# Patient Record
Sex: MALE | Race: WHITE | NOT HISPANIC OR LATINO | URBAN - METROPOLITAN AREA
[De-identification: names, ages, dates, MRNs, and addresses within clinical notes are randomized per-mention and may not be internally consistent; named-entity substitution may affect disease eponyms.]

---

## 2019-03-23 ENCOUNTER — INPATIENT (INPATIENT)
Facility: HOSPITAL | Age: 79
LOS: 5 days | Discharge: HOME HEALTH | End: 2019-03-29

## 2019-03-23 VITALS
DIASTOLIC BLOOD PRESSURE: 78 MMHG | HEART RATE: 101 BPM | TEMPERATURE: 97 F | SYSTOLIC BLOOD PRESSURE: 132 MMHG | RESPIRATION RATE: 18 BRPM | OXYGEN SATURATION: 97 %

## 2019-03-23 PROBLEM — Z00.00 ENCOUNTER FOR PREVENTIVE HEALTH EXAMINATION: Status: ACTIVE | Noted: 2019-03-23

## 2019-03-23 LAB
ALBUMIN SERPL ELPH-MCNC: 4.4 G/DL — SIGNIFICANT CHANGE UP (ref 3.5–5.2)
ALP SERPL-CCNC: 65 U/L — SIGNIFICANT CHANGE UP (ref 30–115)
ALT FLD-CCNC: 44 U/L — HIGH (ref 0–41)
ANION GAP SERPL CALC-SCNC: 13 MMOL/L — SIGNIFICANT CHANGE UP (ref 7–14)
APPEARANCE UR: CLEAR — SIGNIFICANT CHANGE UP
AST SERPL-CCNC: 203 U/L — HIGH (ref 0–41)
BACTERIA # UR AUTO: ABNORMAL /HPF
BASOPHILS # BLD AUTO: 0.03 K/UL — SIGNIFICANT CHANGE UP (ref 0–0.2)
BASOPHILS NFR BLD AUTO: 0.4 % — SIGNIFICANT CHANGE UP (ref 0–1)
BILIRUB SERPL-MCNC: 0.6 MG/DL — SIGNIFICANT CHANGE UP (ref 0.2–1.2)
BILIRUB UR-MCNC: NEGATIVE — SIGNIFICANT CHANGE UP
BUN SERPL-MCNC: 18 MG/DL — SIGNIFICANT CHANGE UP (ref 10–20)
CALCIUM SERPL-MCNC: 9.4 MG/DL — SIGNIFICANT CHANGE UP (ref 8.5–10.1)
CHLORIDE SERPL-SCNC: 97 MMOL/L — LOW (ref 98–110)
CO2 SERPL-SCNC: 25 MMOL/L — SIGNIFICANT CHANGE UP (ref 17–32)
COLOR SPEC: YELLOW — SIGNIFICANT CHANGE UP
CREAT SERPL-MCNC: 1.2 MG/DL — SIGNIFICANT CHANGE UP (ref 0.7–1.5)
DIFF PNL FLD: ABNORMAL
EOSINOPHIL # BLD AUTO: 0 K/UL — SIGNIFICANT CHANGE UP (ref 0–0.7)
EOSINOPHIL NFR BLD AUTO: 0 % — SIGNIFICANT CHANGE UP (ref 0–8)
GLUCOSE SERPL-MCNC: 92 MG/DL — SIGNIFICANT CHANGE UP (ref 70–99)
GLUCOSE UR QL: NEGATIVE MG/DL — SIGNIFICANT CHANGE UP
GRAN CASTS # UR COMP ASSIST: ABNORMAL /LPF
HCT VFR BLD CALC: 35.3 % — LOW (ref 42–52)
HGB BLD-MCNC: 12.3 G/DL — LOW (ref 14–18)
IMM GRANULOCYTES NFR BLD AUTO: 0.1 % — SIGNIFICANT CHANGE UP (ref 0.1–0.3)
KETONES UR-MCNC: ABNORMAL
LEUKOCYTE ESTERASE UR-ACNC: NEGATIVE — SIGNIFICANT CHANGE UP
LYMPHOCYTES # BLD AUTO: 1.04 K/UL — LOW (ref 1.2–3.4)
LYMPHOCYTES # BLD AUTO: 15.5 % — LOW (ref 20.5–51.1)
MCHC RBC-ENTMCNC: 29.3 PG — SIGNIFICANT CHANGE UP (ref 27–31)
MCHC RBC-ENTMCNC: 34.8 G/DL — SIGNIFICANT CHANGE UP (ref 32–37)
MCV RBC AUTO: 84 FL — SIGNIFICANT CHANGE UP (ref 80–94)
MONOCYTES # BLD AUTO: 0.95 K/UL — HIGH (ref 0.1–0.6)
MONOCYTES NFR BLD AUTO: 14.2 % — HIGH (ref 1.7–9.3)
NEUTROPHILS # BLD AUTO: 4.66 K/UL — SIGNIFICANT CHANGE UP (ref 1.4–6.5)
NEUTROPHILS NFR BLD AUTO: 69.8 % — SIGNIFICANT CHANGE UP (ref 42.2–75.2)
NITRITE UR-MCNC: NEGATIVE — SIGNIFICANT CHANGE UP
NRBC # BLD: 0 /100 WBCS — SIGNIFICANT CHANGE UP (ref 0–0)
PH UR: 6 — SIGNIFICANT CHANGE UP (ref 5–8)
PLATELET # BLD AUTO: 208 K/UL — SIGNIFICANT CHANGE UP (ref 130–400)
POTASSIUM SERPL-MCNC: 4 MMOL/L — SIGNIFICANT CHANGE UP (ref 3.5–5)
POTASSIUM SERPL-SCNC: 4 MMOL/L — SIGNIFICANT CHANGE UP (ref 3.5–5)
PROT SERPL-MCNC: 7 G/DL — SIGNIFICANT CHANGE UP (ref 6–8)
PROT UR-MCNC: 100 MG/DL
RBC # BLD: 4.2 M/UL — LOW (ref 4.7–6.1)
RBC # FLD: 12 % — SIGNIFICANT CHANGE UP (ref 11.5–14.5)
RBC CASTS # UR COMP ASSIST: SIGNIFICANT CHANGE UP /HPF
SODIUM SERPL-SCNC: 135 MMOL/L — SIGNIFICANT CHANGE UP (ref 135–146)
SP GR SPEC: 1.02 — SIGNIFICANT CHANGE UP (ref 1.01–1.03)
TROPONIN T SERPL-MCNC: 0.03 NG/ML — CRITICAL HIGH
UROBILINOGEN FLD QL: 1 MG/DL (ref 0.2–0.2)
WBC # BLD: 6.69 K/UL — SIGNIFICANT CHANGE UP (ref 4.8–10.8)
WBC # FLD AUTO: 6.69 K/UL — SIGNIFICANT CHANGE UP (ref 4.8–10.8)
WBC UR QL: SIGNIFICANT CHANGE UP /HPF

## 2019-03-23 RX ORDER — HYDROCHLOROTHIAZIDE 25 MG
25 TABLET ORAL DAILY
Qty: 0 | Refills: 0 | Status: DISCONTINUED | OUTPATIENT
Start: 2019-03-23 | End: 2019-03-26

## 2019-03-23 RX ORDER — GABAPENTIN 400 MG/1
100 CAPSULE ORAL
Qty: 0 | Refills: 0 | Status: DISCONTINUED | OUTPATIENT
Start: 2019-03-23 | End: 2019-03-29

## 2019-03-23 RX ORDER — ATORVASTATIN CALCIUM 80 MG/1
20 TABLET, FILM COATED ORAL AT BEDTIME
Qty: 0 | Refills: 0 | Status: DISCONTINUED | OUTPATIENT
Start: 2019-03-23 | End: 2019-03-24

## 2019-03-23 RX ORDER — ATORVASTATIN CALCIUM 80 MG/1
1 TABLET, FILM COATED ORAL
Qty: 0 | Refills: 0 | COMMUNITY

## 2019-03-23 RX ORDER — LOSARTAN POTASSIUM 100 MG/1
100 TABLET, FILM COATED ORAL DAILY
Qty: 0 | Refills: 0 | Status: DISCONTINUED | OUTPATIENT
Start: 2019-03-23 | End: 2019-03-27

## 2019-03-23 RX ORDER — ENOXAPARIN SODIUM 100 MG/ML
40 INJECTION SUBCUTANEOUS EVERY 24 HOURS
Qty: 0 | Refills: 0 | Status: DISCONTINUED | OUTPATIENT
Start: 2019-03-23 | End: 2019-03-25

## 2019-03-23 RX ORDER — ASPIRIN/CALCIUM CARB/MAGNESIUM 324 MG
81 TABLET ORAL DAILY
Qty: 0 | Refills: 0 | Status: DISCONTINUED | OUTPATIENT
Start: 2019-03-23 | End: 2019-03-29

## 2019-03-23 NOTE — ED PROVIDER NOTE - CARE PLAN
Principal Discharge DX:	Chest pain  Secondary Diagnosis:	Generalized weakness  Secondary Diagnosis:	Transaminitis

## 2019-03-23 NOTE — ED PROVIDER NOTE - PHYSICAL EXAMINATION
VITAL SIGNS: I have reviewed nursing notes and confirm.  CONSTITUTIONAL: elderly m nad  SKIN: Skin exam is warm and dry, no acute rash.  HEAD: Normocephalic; atraumatic.  EYES: PERRL, EOM intact; conjunctiva and sclera clear.  ENT: No nasal discharge; airway clear.  NECK: Supple; non tender.  CARD: S1, S2 normal; no murmurs, gallops, or rubs. Regular rate and rhythm.  RESP: No wheezes, rales or rhonchi.  ABD: Normal bowel sounds; soft; non-distended; non-tender; no hepatosplenomegaly.  BACK: non-tender, no CVAT  EXT: Normal ROM. No clubbing, cyanosis or edema. DPI.  NEURO: aaox3, cn 2-12 intact bl no nystagmus or facial droop, 5/5 strength x 4 no drift, gross sensation intact, finger-nose nl  PSYCH: Cooperative, appropriate.

## 2019-03-23 NOTE — ED PROVIDER NOTE - OBJECTIVE STATEMENT
78y m deaf/mute h/o dm, htn, hl, leaky heart valve, recently elevated psa level pending outpt Uro consultation p/w weakness. HPI obtained from daughter @ bedside, states pt communicates by reading lips and mimicking actions, understands mostly [place] and some English, does not know sign language. Pt lives with his wife and son, daughter states she went over their house to visit today and went in to pt's bedroom. Found him in bed awake but he had not gotten up yet. Left room to allow him to get dressed and come out, some time passed and when she went back in to k on him found him sitting on floor to weak to get up. Tried to pick him up with her brother but he could not walk on his own, called 911. After talking with pt daughter rpts he communicated to them that he has been having intermitt CP x 1 week. Difficult to characterize/described due to baseline communication issues. Also rpts feeling generalized weakness over last week. Denies f/c, sob, nvd, abd pain, rash, edema. No sick contacts, recent travel or abx.

## 2019-03-23 NOTE — ED ADULT TRIAGE NOTE - CHIEF COMPLAINT QUOTE
pt with c/o chest pain for a week., weakness, fell today denies hitting his head , not on bloodthinners

## 2019-03-23 NOTE — ED ADULT NURSE NOTE - NSIMPLEMENTINTERV_GEN_ALL_ED
Implemented All Fall Risk Interventions:  [place] to call system. Call bell, personal items and telephone within reach. Instruct patient to call for assistance. Room bathroom lighting operational. Non-slip footwear when patient is off stretcher. Physically safe environment: no spills, clutter or unnecessary equipment. Stretcher in lowest position, wheels locked, appropriate side rails in place. Provide visual cue, wrist band, yellow gown, etc. Monitor gait and stability. Monitor for mental status changes and reorient to person, place, and time. Review medications for side effects contributing to fall risk. Reinforce activity limits and safety measures with patient and family.

## 2019-03-23 NOTE — ED PROVIDER NOTE - NS ED ROS FT
Constitutional: No fever, chills, unintended weight loss.  Eyes:  No visual changes, eye pain or discharge.  ENMT:  No hearing changes, pain, no sore throat or runny nose, no difficulty swallowing  Cardiac:  see hpi  Respiratory:  No cough or respiratory distress. No hemoptysis. No history of asthma or RAD.  GI:  No nausea, vomiting, diarrhea or abdominal pain.  :  No dysuria, frequency or burning.  MS:  No myalgia, muscle weakness, joint pain or back pain.  Neuro:  No headache or weakness.  No LOC.  Skin:  No skin rash.   Endocrine: No history of thyroid disease or diabetes.

## 2019-03-23 NOTE — H&P ADULT - NSHPLABSRESULTS_GEN_ALL_CORE
12.3   6.69  )-----------( 208      ( 23 Mar 2019 16:50 )             35.3           135  |  97<L>  |  18  ----------------------------<  92  4.0   |  25  |  1.2    Ca    9.4      23 Mar 2019 16:50    TPro  7.0  /  Alb  4.4  /  TBili  0.6  /  DBili  x   /  AST  203<H>  /  ALT  44<H>  /  AlkPhos  65                Urinalysis Basic - ( 23 Mar 2019 17:25 )    Color: Yellow / Appearance: Clear / S.020 / pH: x  Gluc: x / Ketone: Trace  / Bili: Negative / Urobili: 1.0 mg/dL   Blood: x / Protein: 100 mg/dL / Nitrite: Negative   Leuk Esterase: Negative / RBC: 1-2 /HPF / WBC 1-2 /HPF   Sq Epi: x / Non Sq Epi: x / Bacteria: Few /HPF            Lactate Trend      CARDIAC MARKERS ( 23 Mar 2019 16:50 )  x     / 0.03 ng/mL / x     / x     / x            CAPILLARY BLOOD GLUCOSE      POCT Blood Glucose.: 92 mg/dL (23 Mar 2019 18:39)    < from: 12 Lead ECG (19 @ 17:14) >    Normal sinus rhythm  Left bundle branch block  Abnormal ECG    < end of copied text >

## 2019-03-23 NOTE — H&P ADULT - NSHPPHYSICALEXAM_GEN_ALL_CORE
T(C): 37.8 (03-23-19 @ 18:45), Max: 37.8 (03-23-19 @ 18:45)  HR: 101 (03-23-19 @ 15:37) (101 - 101)  BP: 132/78 (03-23-19 @ 15:37) (132/78 - 132/78)  RR: 18 (03-23-19 @ 15:37) (18 - 18)  SpO2: 97% (03-23-19 @ 15:37) (97% - 97%)    VITAL SIGNS: I have reviewed nursing notes and confirm.  CONSTITUTIONAL: in no acute distress.  HEAD: Normocephalic; atraumatic.  NECK: Supple; non tender. -  CARD: S1, S2 normal; Regular rate and rhythm.  RESP: No wheezes, rales or rhonchi.  ABD: soft; non-distended; non-tender;  EXT: Normal ROM. No clubbing, cyanosis or edema.  NEURO: Alert, oriented. sensory intact, motor -5/5 b/l ue, 3/5 b/l le   PSYCH: Cooperative, appropriate.

## 2019-03-23 NOTE — H&P ADULT - HISTORY OF PRESENT ILLNESS
74 y m who is Yi, deaf and mute, htn, dm, diabetic neuropathy, high psa but no workup was done presented with unwitnessed fall. As per daughter who is hcp, he uses cane and bike at baseline, he was refusing to work with rehab before. he was found to be weak since yesterday night and usually he needs assist to get up. today morning he was sleeping till 1 then when they went to wake him up, he was up  and they were waiting for him to get dresssed up but it took him more than 10min, so they went to check on him and found him on the floor unable to get up. when asked in the ed, he mentioned that he has chest pain for 1 week which is intermittent and non radiating , not associated with cough or sob. 74 y m who is Turkmen, deaf and mute, htn, dm, diabetic neuropathy, high psa but no workup was done presented with unwitnessed fall. As per daughter who is hcp, he uses cane and bike at baseline, he was refusing to work with rehab before. he was found to be weak since yesterday night and usually he needs assist to get up. today morning he was sleeping till 1 then when they went to wake him up, he was up  and they were waiting for him to get dresssed up but it took him more than 10min, so they went to check on him and found him on the floor unable to get up. when asked in the ed, he mentioned that he has chest pain for 1 week which is intermittent and non radiating , not associated with cough or sob.   family essentially says unable to take care of him

## 2019-03-23 NOTE — H&P ADULT - ASSESSMENT
74 y m who is Polish, deaf and mute, htn, dm, diabetic neuropathy, high psa but no workup was done presented with unwitnessed fall. and chest pain    1) unwitnessed fall could be due to worsening neuropathy vs arrythmia vs orthostatics  check orthostatics  telemonitoring for now  fall risk precautions  will c/w gabapentin   pt rehab eval    2) chest pain with lbbb on ekg , no ekg  for comparison   will repeat ce x2  check echo  c/w asa  repeat ekg in am    3) transaminitis- no hx of alcohol use, us- small gallbladder polyp  monitor lfts      4) htn- c/w losartan and hctz    5) dm- monitor fs and start insulin if fs >200    6) high psa as per family, will f/u as op    70 dvt ppx   diet- cc  dispo- pending pt eval 74 y m who is Tajik, deaf and mute, htn, dm, diabetic neuropathy, high psa but no workup was done presented with unwitnessed fall. and chest pain    1) unwitnessed fall could be due to worsening neuropathy vs arrythmia vs orthostatics  check orthostatics  telemonitoring for now  fall risk precautions  will c/w gabapentin   pt rehab eval    2) chest pain with lbbb on ekg , no ekg  for comparison   will repeat ce x2  check echo  c/w asa  repeat ekg in am    3) transaminitis- no hx of alcohol use, us- small gallbladder polyp  monitor lfts  check hepatitis panel        4) htn- c/w losartan and hctz    5) dm- monitor fs and start insulin if fs >200    6) high psa as per family, will f/u as op    70 dvt ppx   diet- cc  dispo- pending pt eval 74 y m who is English, deaf and mute, htn, dm, diabetic neuropathy, high psa but no workup was done presented with unwitnessed fall. and chest pain    1) unwitnessed fall could be due to worsening neuropathy vs arrythmia vs orthostatics  check orthostatics  telemonitoring for now  fall risk precautions  will c/w gabapentin   pt rehab eval    2) chest pain with lbbb on ekg , LBBB is BASELINE  no evidence of cardiac etiology  will repeat ce x2 and downgrade  check echo  c/w asa  repeat ekg in am    3) transaminitis- no hx of alcohol use, us- small gallbladder polyp  monitor lfts  check hepatitis panel        4) htn- c/w losartan and hctz    5) dm- monitor fs and start insulin if fs >200    6) high psa as per family, will f/u as op    72 dvt ppx   diet- cc  dispo- pending pt eval

## 2019-03-23 NOTE — ED PROVIDER NOTE - CLINICAL SUMMARY MEDICAL DECISION MAKING FREE TEXT BOX
generalized weakness, intermitt cp x 1 week - ED w/u incl ekg, cxr, labs unrevealing aside from mildly elevated Tn 0.03 and transaminitis - per family pt to weak to walk @ home, normally uses a cane, uncomfortable with him going home - case d/w PMD Dr. Mendoza, a/w plan for admission for further work-up and inpatient PT/Rehab c/s

## 2019-03-24 LAB
ALBUMIN SERPL ELPH-MCNC: 4.2 G/DL — SIGNIFICANT CHANGE UP (ref 3.5–5.2)
ALP SERPL-CCNC: 56 U/L — SIGNIFICANT CHANGE UP (ref 30–115)
ALT FLD-CCNC: 51 U/L — HIGH (ref 0–41)
ANION GAP SERPL CALC-SCNC: 13 MMOL/L — SIGNIFICANT CHANGE UP (ref 7–14)
AST SERPL-CCNC: 241 U/L — HIGH (ref 0–41)
BASOPHILS # BLD AUTO: 0.03 K/UL — SIGNIFICANT CHANGE UP (ref 0–0.2)
BASOPHILS NFR BLD AUTO: 0.5 % — SIGNIFICANT CHANGE UP (ref 0–1)
BILIRUB DIRECT SERPL-MCNC: <0.2 MG/DL — SIGNIFICANT CHANGE UP (ref 0–0.2)
BILIRUB INDIRECT FLD-MCNC: >0.2 MG/DL — SIGNIFICANT CHANGE UP (ref 0.2–1.2)
BILIRUB SERPL-MCNC: 0.4 MG/DL — SIGNIFICANT CHANGE UP (ref 0.2–1.2)
BUN SERPL-MCNC: 26 MG/DL — HIGH (ref 10–20)
CALCIUM SERPL-MCNC: 9.2 MG/DL — SIGNIFICANT CHANGE UP (ref 8.5–10.1)
CHLORIDE SERPL-SCNC: 102 MMOL/L — SIGNIFICANT CHANGE UP (ref 98–110)
CO2 SERPL-SCNC: 21 MMOL/L — SIGNIFICANT CHANGE UP (ref 17–32)
CREAT SERPL-MCNC: 1.3 MG/DL — SIGNIFICANT CHANGE UP (ref 0.7–1.5)
CULTURE RESULTS: SIGNIFICANT CHANGE UP
EOSINOPHIL # BLD AUTO: 0.02 K/UL — SIGNIFICANT CHANGE UP (ref 0–0.7)
EOSINOPHIL NFR BLD AUTO: 0.3 % — SIGNIFICANT CHANGE UP (ref 0–8)
GLUCOSE BLDC GLUCOMTR-MCNC: 141 MG/DL — HIGH (ref 70–99)
GLUCOSE BLDC GLUCOMTR-MCNC: 170 MG/DL — HIGH (ref 70–99)
GLUCOSE BLDC GLUCOMTR-MCNC: 179 MG/DL — HIGH (ref 70–99)
GLUCOSE BLDC GLUCOMTR-MCNC: 211 MG/DL — HIGH (ref 70–99)
GLUCOSE BLDC GLUCOMTR-MCNC: 254 MG/DL — HIGH (ref 70–99)
GLUCOSE SERPL-MCNC: 161 MG/DL — HIGH (ref 70–99)
HCT VFR BLD CALC: 34.8 % — LOW (ref 42–52)
HGB BLD-MCNC: 11.9 G/DL — LOW (ref 14–18)
IMM GRANULOCYTES NFR BLD AUTO: 0.3 % — SIGNIFICANT CHANGE UP (ref 0.1–0.3)
LYMPHOCYTES # BLD AUTO: 1.38 K/UL — SIGNIFICANT CHANGE UP (ref 1.2–3.4)
LYMPHOCYTES # BLD AUTO: 21.1 % — SIGNIFICANT CHANGE UP (ref 20.5–51.1)
MAGNESIUM SERPL-MCNC: 1.9 MG/DL — SIGNIFICANT CHANGE UP (ref 1.8–2.4)
MCHC RBC-ENTMCNC: 29.1 PG — SIGNIFICANT CHANGE UP (ref 27–31)
MCHC RBC-ENTMCNC: 34.2 G/DL — SIGNIFICANT CHANGE UP (ref 32–37)
MCV RBC AUTO: 85.1 FL — SIGNIFICANT CHANGE UP (ref 80–94)
MONOCYTES # BLD AUTO: 0.88 K/UL — HIGH (ref 0.1–0.6)
MONOCYTES NFR BLD AUTO: 13.5 % — HIGH (ref 1.7–9.3)
NEUTROPHILS # BLD AUTO: 4.21 K/UL — SIGNIFICANT CHANGE UP (ref 1.4–6.5)
NEUTROPHILS NFR BLD AUTO: 64.3 % — SIGNIFICANT CHANGE UP (ref 42.2–75.2)
NRBC # BLD: 0 /100 WBCS — SIGNIFICANT CHANGE UP (ref 0–0)
PLATELET # BLD AUTO: 204 K/UL — SIGNIFICANT CHANGE UP (ref 130–400)
POTASSIUM SERPL-MCNC: 4 MMOL/L — SIGNIFICANT CHANGE UP (ref 3.5–5)
POTASSIUM SERPL-SCNC: 4 MMOL/L — SIGNIFICANT CHANGE UP (ref 3.5–5)
PROT SERPL-MCNC: 6.4 G/DL — SIGNIFICANT CHANGE UP (ref 6–8)
RBC # BLD: 4.09 M/UL — LOW (ref 4.7–6.1)
RBC # FLD: 12 % — SIGNIFICANT CHANGE UP (ref 11.5–14.5)
SODIUM SERPL-SCNC: 136 MMOL/L — SIGNIFICANT CHANGE UP (ref 135–146)
SPECIMEN SOURCE: SIGNIFICANT CHANGE UP
TROPONIN T SERPL-MCNC: 0.03 NG/ML — CRITICAL HIGH
TROPONIN T SERPL-MCNC: 0.04 NG/ML — CRITICAL HIGH
WBC # BLD: 6.54 K/UL — SIGNIFICANT CHANGE UP (ref 4.8–10.8)
WBC # FLD AUTO: 6.54 K/UL — SIGNIFICANT CHANGE UP (ref 4.8–10.8)

## 2019-03-24 RX ORDER — INSULIN LISPRO 100/ML
3 VIAL (ML) SUBCUTANEOUS
Qty: 0 | Refills: 0 | Status: DISCONTINUED | OUTPATIENT
Start: 2019-03-24 | End: 2019-03-29

## 2019-03-24 RX ORDER — DEXTROSE 50 % IN WATER 50 %
25 SYRINGE (ML) INTRAVENOUS ONCE
Qty: 0 | Refills: 0 | Status: DISCONTINUED | OUTPATIENT
Start: 2019-03-24 | End: 2019-03-29

## 2019-03-24 RX ORDER — DEXTROSE 50 % IN WATER 50 %
15 SYRINGE (ML) INTRAVENOUS ONCE
Qty: 0 | Refills: 0 | Status: DISCONTINUED | OUTPATIENT
Start: 2019-03-24 | End: 2019-03-29

## 2019-03-24 RX ORDER — GLUCAGON INJECTION, SOLUTION 0.5 MG/.1ML
1 INJECTION, SOLUTION SUBCUTANEOUS ONCE
Qty: 0 | Refills: 0 | Status: DISCONTINUED | OUTPATIENT
Start: 2019-03-24 | End: 2019-03-29

## 2019-03-24 RX ORDER — INSULIN GLARGINE 100 [IU]/ML
12 INJECTION, SOLUTION SUBCUTANEOUS AT BEDTIME
Qty: 0 | Refills: 0 | Status: DISCONTINUED | OUTPATIENT
Start: 2019-03-24 | End: 2019-03-29

## 2019-03-24 RX ORDER — DEXTROSE 50 % IN WATER 50 %
12.5 SYRINGE (ML) INTRAVENOUS ONCE
Qty: 0 | Refills: 0 | Status: DISCONTINUED | OUTPATIENT
Start: 2019-03-24 | End: 2019-03-29

## 2019-03-24 RX ORDER — ACETAMINOPHEN 500 MG
650 TABLET ORAL EVERY 6 HOURS
Qty: 0 | Refills: 0 | Status: DISCONTINUED | OUTPATIENT
Start: 2019-03-24 | End: 2019-03-29

## 2019-03-24 RX ORDER — INSULIN LISPRO 100/ML
VIAL (ML) SUBCUTANEOUS
Qty: 0 | Refills: 0 | Status: DISCONTINUED | OUTPATIENT
Start: 2019-03-24 | End: 2019-03-29

## 2019-03-24 RX ADMIN — GABAPENTIN 100 MILLIGRAM(S): 400 CAPSULE ORAL at 17:13

## 2019-03-24 RX ADMIN — Medication 650 MILLIGRAM(S): at 12:42

## 2019-03-24 RX ADMIN — Medication 3 UNIT(S): at 17:30

## 2019-03-24 RX ADMIN — LOSARTAN POTASSIUM 100 MILLIGRAM(S): 100 TABLET, FILM COATED ORAL at 06:41

## 2019-03-24 RX ADMIN — Medication 81 MILLIGRAM(S): at 11:00

## 2019-03-24 RX ADMIN — Medication 650 MILLIGRAM(S): at 11:25

## 2019-03-24 RX ADMIN — Medication 25 MILLIGRAM(S): at 06:41

## 2019-03-24 RX ADMIN — Medication 2: at 17:30

## 2019-03-24 RX ADMIN — GABAPENTIN 100 MILLIGRAM(S): 400 CAPSULE ORAL at 06:41

## 2019-03-24 RX ADMIN — INSULIN GLARGINE 12 UNIT(S): 100 INJECTION, SOLUTION SUBCUTANEOUS at 23:02

## 2019-03-24 RX ADMIN — ENOXAPARIN SODIUM 40 MILLIGRAM(S): 100 INJECTION SUBCUTANEOUS at 06:41

## 2019-03-24 NOTE — PROGRESS NOTE ADULT - SUBJECTIVE AND OBJECTIVE BOX
SUBJECTIVE:    Patient is a 78y old Male who presents with a chief complaint of unwitnessed fall and chest pain (23 Mar 2019 22:50)    Currently admitted to medicine with the primary diagnosis of Chest pain     Today is hospital day 1d. This morning he is resting comfortably in bed and reports no new issues or overnight events.     PAST MEDICAL & SURGICAL HISTORY  Neuropathy  HTN (hypertension)  Diabetes  No significant past surgical history    SOCIAL HISTORY:  Negative for smoking/alcohol/drug use.     ALLERGIES:  No Known Allergies    MEDICATIONS:  STANDING MEDICATIONS  aspirin  chewable 81 milliGRAM(s) Oral daily  atorvastatin 20 milliGRAM(s) Oral at bedtime  enoxaparin Injectable 40 milliGRAM(s) SubCutaneous every 24 hours  gabapentin 100 milliGRAM(s) Oral two times a day  hydrochlorothiazide 25 milliGRAM(s) Oral daily  losartan 100 milliGRAM(s) Oral daily    PRN MEDICATIONS    VITALS:   T(F): 97.2  HR: 102  BP: 124/57  RR: 18  SpO2: 94%    LABS:                        11.9   6.54  )-----------( 204      ( 24 Mar 2019 07:00 )             34.8         136  |  102  |  26<H>  ----------------------------<  161<H>  4.0   |  21  |  1.3    Ca    9.2      24 Mar 2019 07:00  Mg     1.9         TPro  6.4  /  Alb  4.2  /  TBili  0.4  /  DBili  <0.2  /  AST  241<H>  /  ALT  51<H>  /  AlkPhos  56        Urinalysis Basic - ( 23 Mar 2019 17:25 )    Color: Yellow / Appearance: Clear / S.020 / pH: x  Gluc: x / Ketone: Trace  / Bili: Negative / Urobili: 1.0 mg/dL   Blood: x / Protein: 100 mg/dL / Nitrite: Negative   Leuk Esterase: Negative / RBC: 1-2 /HPF / WBC 1-2 /HPF   Sq Epi: x / Non Sq Epi: x / Bacteria: Few /HPF      Troponin T, Serum: 0.04 ng/mL <HH> (19 @ 07:00)  Troponin T, Serum: 0.03 ng/mL <HH> (19 @ 16:50)    CARDIAC MARKERS ( 24 Mar 2019 07:00 )  x     / 0.04 ng/mL / x     / x     / x      CARDIAC MARKERS ( 23 Mar 2019 16:50 )  x     / 0.03 ng/mL / x     / x     / x          RADIOLOGY:  US Abdomen Limited (19)  - No sonographic evidence for cholelithiasis or cholecystitis.  - 5 mm gallbladder wall polyp.    CT Head No Cont (19)  - No CT evidence of acute intracranial pathology. Essentially stable examination since 2016.  - Moderate to severe chronic microvascular ischemic changes.    CT Cervical Spine No Cont (19)  - No evidence of acute cervical spine fracture or subluxation.  - Moderate degenerative changes of the cervical spine, most prominent at C6-7 where there is a left paracentral disc herniation resulting in mild spinal canal stenosis and moderate left neuroforaminal narrowing.     Xray Chest 1 View-PORTABLE IMMEDIATE (19)  - No radiographic evidence of acute cardiopulmonary disease.  - Cardiomegaly.    PHYSICAL EXAM:  GEN: No acute distress  LUNGS: Clear to auscultation bilaterally   HEART: S1/S2 present. RRR.   ABD: Soft, non-tender, non-distended. Bowel sounds present  EXT: NC/NC/NE/2+PP/FLOREZ  NEURO: AAOX3 SUBJECTIVE:    Patient is a 78y old Male who presents with a chief complaint of unwitnessed fall and chest pain (23 Mar 2019 22:50)    Currently admitted to medicine with the primary diagnosis of Chest pain     Today is hospital day 1d. This morning he is resting in bed and reports no new issues or overnight events. Family at bedside states patient is weaker than baseline.     PAST MEDICAL & SURGICAL HISTORY  Neuropathy  HTN (hypertension)  Diabetes  No significant past surgical history    SOCIAL HISTORY:  Negative for smoking/alcohol/drug use.     ALLERGIES:  No Known Allergies    MEDICATIONS:  STANDING MEDICATIONS  aspirin  chewable 81 milliGRAM(s) Oral daily  atorvastatin 20 milliGRAM(s) Oral at bedtime  enoxaparin Injectable 40 milliGRAM(s) SubCutaneous every 24 hours  gabapentin 100 milliGRAM(s) Oral two times a day  hydrochlorothiazide 25 milliGRAM(s) Oral daily  losartan 100 milliGRAM(s) Oral daily    PRN MEDICATIONS    VITALS:   T(F): 97.2  HR: 102  BP: 124/57  RR: 18  SpO2: 94%    LABS:                        11.9   6.54  )-----------( 204      ( 24 Mar 2019 07:00 )             34.8         136  |  102  |  26<H>  ----------------------------<  161<H>  4.0   |  21  |  1.3    Ca    9.2      24 Mar 2019 07:00  Mg     1.9         TPro  6.4  /  Alb  4.2  /  TBili  0.4  /  DBili  <0.2  /  AST  241<H>  /  ALT  51<H>  /  AlkPhos  56        Urinalysis Basic - ( 23 Mar 2019 17:25 )    Color: Yellow / Appearance: Clear / S.020 / pH: x  Gluc: x / Ketone: Trace  / Bili: Negative / Urobili: 1.0 mg/dL   Blood: x / Protein: 100 mg/dL / Nitrite: Negative   Leuk Esterase: Negative / RBC: 1-2 /HPF / WBC 1-2 /HPF   Sq Epi: x / Non Sq Epi: x / Bacteria: Few /HPF      Troponin T, Serum: 0.04 ng/mL <HH> (19 @ 07:00)  Troponin T, Serum: 0.03 ng/mL <HH> (19 @ 16:50)    CARDIAC MARKERS ( 24 Mar 2019 07:00 )  x     / 0.04 ng/mL / x     / x     / x      CARDIAC MARKERS ( 23 Mar 2019 16:50 )  x     / 0.03 ng/mL / x     / x     / x          RADIOLOGY:  US Abdomen Limited (19)  - No sonographic evidence for cholelithiasis or cholecystitis.  - 5 mm gallbladder wall polyp.    CT Head No Cont (19)  - No CT evidence of acute intracranial pathology. Essentially stable examination since 2016.  - Moderate to severe chronic microvascular ischemic changes.    CT Cervical Spine No Cont (19)  - No evidence of acute cervical spine fracture or subluxation.  - Moderate degenerative changes of the cervical spine, most prominent at C6-7 where there is a left paracentral disc herniation resulting in mild spinal canal stenosis and moderate left neuroforaminal narrowing.     Xray Chest 1 View-PORTABLE IMMEDIATE (19)  - No radiographic evidence of acute cardiopulmonary disease.  - Cardiomegaly.    PHYSICAL EXAM:  GEN: No acute distress, weak   LUNGS: Clear to auscultation bilaterally   HEART: S1/S2 present. RRR.   ABD: Soft, non-tender, non-distended. Bowel sounds present  EXT: NC/NC/NE/2+PP/FLOREZ  NEURO: AAOX3 SUBJECTIVE:    Patient is a 78y old Male who presents with a chief complaint of unwitnessed fall and chest pain (23 Mar 2019 22:50)    Currently admitted to medicine with the primary diagnosis of Chest pain and fall and feeling weak     Today is hospital day 1d. This morning he is resting in bed and reports no new issues or overnight events. Family at bedside states patient is weaker than baseline.     PAST MEDICAL & SURGICAL HISTORY  Neuropathy  HTN (hypertension)  Diabetes  No significant past surgical history    SOCIAL HISTORY:  Negative for smoking/alcohol/drug use.     ALLERGIES:  No Known Allergies    MEDICATIONS:  STANDING MEDICATIONS  aspirin  chewable 81 milliGRAM(s) Oral daily  atorvastatin 20 milliGRAM(s) Oral at bedtime  enoxaparin Injectable 40 milliGRAM(s) SubCutaneous every 24 hours  gabapentin 100 milliGRAM(s) Oral two times a day  hydrochlorothiazide 25 milliGRAM(s) Oral daily  losartan 100 milliGRAM(s) Oral daily    PRN MEDICATIONS    VITALS:   T(F): 97.2  HR: 102  BP: 124/57  RR: 18  SpO2: 94%    LABS:                        11.9   6.54  )-----------( 204      ( 24 Mar 2019 07:00 )             34.8         136  |  102  |  26<H>  ----------------------------<  161<H>  4.0   |  21  |  1.3    Ca    9.2      24 Mar 2019 07:00  Mg     1.9         TPro  6.4  /  Alb  4.2  /  TBili  0.4  /  DBili  <0.2  /  AST  241<H>  /  ALT  51<H>  /  AlkPhos  56        Urinalysis Basic - ( 23 Mar 2019 17:25 )    Color: Yellow / Appearance: Clear / S.020 / pH: x  Gluc: x / Ketone: Trace  / Bili: Negative / Urobili: 1.0 mg/dL   Blood: x / Protein: 100 mg/dL / Nitrite: Negative   Leuk Esterase: Negative / RBC: 1-2 /HPF / WBC 1-2 /HPF   Sq Epi: x / Non Sq Epi: x / Bacteria: Few /HPF      Troponin T, Serum: 0.04 ng/mL <HH> (19 @ 07:00)  Troponin T, Serum: 0.03 ng/mL <HH> (19 @ 16:50)    CARDIAC MARKERS ( 24 Mar 2019 07:00 )  x     / 0.04 ng/mL / x     / x     / x      CARDIAC MARKERS ( 23 Mar 2019 16:50 )  x     / 0.03 ng/mL / x     / x     / x          RADIOLOGY:  US Abdomen Limited (19)  - No sonographic evidence for cholelithiasis or cholecystitis.  - 5 mm gallbladder wall polyp.    CT Head No Cont (19)  - No CT evidence of acute intracranial pathology. Essentially stable examination since 2016.  - Moderate to severe chronic microvascular ischemic changes.    CT Cervical Spine No Cont (19)  - No evidence of acute cervical spine fracture or subluxation.  - Moderate degenerative changes of the cervical spine, most prominent at C6-7 where there is a left paracentral disc herniation resulting in mild spinal canal stenosis and moderate left neuroforaminal narrowing.     Xray Chest 1 View-PORTABLE IMMEDIATE (19)  - No radiographic evidence of acute cardiopulmonary disease.  - Cardiomegaly.    PHYSICAL EXAM:  GEN: No acute distress, weak   LUNGS: Clear to auscultation bilaterally   HEART: S1/S2 present. RRR.   ABD: Soft, non-tender, non-distended. Bowel sounds present  EXT: NC/NC/NE/2+PP/FLOREZ  NEURO: AAOX3

## 2019-03-24 NOTE — PROGRESS NOTE ADULT - ASSESSMENT
74 y m who is [place]ian deaf and mute, htn, dm, diabetic neuropathy, high psa but no workup was done presented with unwitnessed fall    # Unwitnessed fall could be due to worsening neuropathy vs arrythmia vs orthostatics  - check orthostatics  - trops neg x2  - EKG: LBBB (LBBB is BASELINE)  - f/u ECHO  - fall risk precautions  - will c/w gabapentin  - pt rehab eval    # Transaminitis  - US Abdomen Limited (03.23.19): No sonographic evidence for cholelithiasis or cholecystitis. 5 mm gallbladder wall polyp.  - no hx of alcohol use  - will d/c atorvastatin   - follow up hepatitis panel  -monitor lfts    # HTN  - c/w losartan and hctz    # DM2   - monitor fingerstick   - start insulin if FS>200    # Hematuria   - hx of high psa   - will get US bladder and prostate    # DVT Ppx  - c/w enoxaparin     # Disposition  - anticipating discharge within 24-48 hrs  - PT eval     # Full code status 74 y m who is Nepali deaf and mute, htn, dm, diabetic neuropathy, high psa but no workup was done presented with unwitnessed fall    # Unwitnessed fall could be due to worsening neuropathy vs arrythmia vs orthostatics  - check orthostatics  - trops neg x2  - EKG: LBBB (LBBB is BASELINE)  - f/u ECHO  - fall risk precautions  - will c/w gabapentin  - pt rehab eval    # Transaminitis  - US Abdomen Limited (03.23.19): No sonographic evidence for cholelithiasis or cholecystitis. 5 mm gallbladder wall polyp.  - no hx of alcohol use  - will d/c atorvastatin   - follow up hepatitis panel  - monitor lfts    # HTN  - c/w losartan and hctz    # DM2   - monitor fingerstick   - start insulin if FS>200    # Hematuria   - hx of high psa   - will get US bladder and prostate    # DVT Ppx  - c/w enoxaparin     # DASH / Carb consistent diet     # Disposition  - anticipating discharge as per PT eval     # Full code status 74 y m who is Frisian deaf and mute, htn, dm, diabetic neuropathy, high psa but no workup was done presented with unwitnessed fall    # Unwitnessed fall - likely due to progressive deconditioning 2/2 pt not ambulating for months  - check orthostatics  - trops neg x2  - EKG: LBBB (LBBB is BASELINE)  - f/u ECHO  - fall risk precautions  - will c/w gabapentin  - pt rehab eval    # Transaminitis  - US Abdomen Limited (03.23.19): No sonographic evidence for cholelithiasis or cholecystitis. 5 mm gallbladder wall polyp.  - no hx of alcohol use  - will d/c atorvastatin   - follow up hepatitis panel  - monitor lfts    # HTN  - c/w losartan and hctz    # DM2   - monitor fingerstick   - start insulin if FS>200    # Hematuria   - hx of high psa   - will get US bladder and prostate    # DVT Ppx  - c/w enoxaparin     # DASH / Carb consistent diet     # Disposition  - anticipating discharge as per PT eval     PLAN - rehab eval and short term rehab

## 2019-03-25 LAB
ALBUMIN SERPL ELPH-MCNC: 4.1 G/DL — SIGNIFICANT CHANGE UP (ref 3.5–5.2)
ALP SERPL-CCNC: 51 U/L — SIGNIFICANT CHANGE UP (ref 30–115)
ALT FLD-CCNC: 55 U/L — HIGH (ref 0–41)
ANION GAP SERPL CALC-SCNC: 16 MMOL/L — HIGH (ref 7–14)
APTT BLD: 127.6 SEC — CRITICAL HIGH (ref 27–39.2)
APTT BLD: 185 SEC — CRITICAL HIGH (ref 27–39.2)
APTT BLD: 35.5 SEC — SIGNIFICANT CHANGE UP (ref 27–39.2)
APTT BLD: >200 SEC — CRITICAL HIGH (ref 27–39.2)
APTT BLD: >200 SEC — CRITICAL HIGH (ref 27–39.2)
AST SERPL-CCNC: 226 U/L — HIGH (ref 0–41)
BASOPHILS # BLD AUTO: 0.01 K/UL — SIGNIFICANT CHANGE UP (ref 0–0.2)
BASOPHILS # BLD AUTO: 0.02 K/UL — SIGNIFICANT CHANGE UP (ref 0–0.2)
BASOPHILS NFR BLD AUTO: 0.1 % — SIGNIFICANT CHANGE UP (ref 0–1)
BASOPHILS NFR BLD AUTO: 0.2 % — SIGNIFICANT CHANGE UP (ref 0–1)
BILIRUB DIRECT SERPL-MCNC: <0.2 MG/DL — SIGNIFICANT CHANGE UP (ref 0–0.2)
BILIRUB INDIRECT FLD-MCNC: >0.3 MG/DL — SIGNIFICANT CHANGE UP (ref 0.2–1.2)
BILIRUB SERPL-MCNC: 0.5 MG/DL — SIGNIFICANT CHANGE UP (ref 0.2–1.2)
BUN SERPL-MCNC: 34 MG/DL — HIGH (ref 10–20)
CALCIUM SERPL-MCNC: 8.8 MG/DL — SIGNIFICANT CHANGE UP (ref 8.5–10.1)
CHLORIDE SERPL-SCNC: 102 MMOL/L — SIGNIFICANT CHANGE UP (ref 98–110)
CO2 SERPL-SCNC: 20 MMOL/L — SIGNIFICANT CHANGE UP (ref 17–32)
CREAT SERPL-MCNC: 1.5 MG/DL — SIGNIFICANT CHANGE UP (ref 0.7–1.5)
EOSINOPHIL # BLD AUTO: 0 K/UL — SIGNIFICANT CHANGE UP (ref 0–0.7)
EOSINOPHIL # BLD AUTO: 0.01 K/UL — SIGNIFICANT CHANGE UP (ref 0–0.7)
EOSINOPHIL NFR BLD AUTO: 0 % — SIGNIFICANT CHANGE UP (ref 0–8)
EOSINOPHIL NFR BLD AUTO: 0.1 % — SIGNIFICANT CHANGE UP (ref 0–8)
ESTIMATED AVERAGE GLUCOSE: 148 MG/DL — HIGH (ref 68–114)
GLUCOSE BLDC GLUCOMTR-MCNC: 179 MG/DL — HIGH (ref 70–99)
GLUCOSE BLDC GLUCOMTR-MCNC: 207 MG/DL — HIGH (ref 70–99)
GLUCOSE BLDC GLUCOMTR-MCNC: 210 MG/DL — HIGH (ref 70–99)
GLUCOSE BLDC GLUCOMTR-MCNC: 326 MG/DL — HIGH (ref 70–99)
GLUCOSE SERPL-MCNC: 206 MG/DL — HIGH (ref 70–99)
HAV IGM SER-ACNC: SIGNIFICANT CHANGE UP
HBA1C BLD-MCNC: 6.8 % — HIGH (ref 4–5.6)
HBV CORE IGM SER-ACNC: SIGNIFICANT CHANGE UP
HBV SURFACE AG SER-ACNC: SIGNIFICANT CHANGE UP
HCT VFR BLD CALC: 35.3 % — LOW (ref 42–52)
HCT VFR BLD CALC: 36.9 % — LOW (ref 42–52)
HCV AB S/CO SERPL IA: 0.09 S/CO — SIGNIFICANT CHANGE UP (ref 0–0.79)
HCV AB SERPL-IMP: SIGNIFICANT CHANGE UP
HGB BLD-MCNC: 12.4 G/DL — LOW (ref 14–18)
HGB BLD-MCNC: 12.7 G/DL — LOW (ref 14–18)
IMM GRANULOCYTES NFR BLD AUTO: 0.2 % — SIGNIFICANT CHANGE UP (ref 0.1–0.3)
IMM GRANULOCYTES NFR BLD AUTO: 0.5 % — HIGH (ref 0.1–0.3)
INR BLD: 1.18 RATIO — SIGNIFICANT CHANGE UP (ref 0.65–1.3)
INR BLD: 1.24 RATIO — SIGNIFICANT CHANGE UP (ref 0.65–1.3)
LYMPHOCYTES # BLD AUTO: 0.86 K/UL — LOW (ref 1.2–3.4)
LYMPHOCYTES # BLD AUTO: 1.53 K/UL — SIGNIFICANT CHANGE UP (ref 1.2–3.4)
LYMPHOCYTES # BLD AUTO: 18.4 % — LOW (ref 20.5–51.1)
LYMPHOCYTES # BLD AUTO: 9 % — LOW (ref 20.5–51.1)
MAGNESIUM SERPL-MCNC: 1.8 MG/DL — SIGNIFICANT CHANGE UP (ref 1.8–2.4)
MCHC RBC-ENTMCNC: 29.3 PG — SIGNIFICANT CHANGE UP (ref 27–31)
MCHC RBC-ENTMCNC: 29.7 PG — SIGNIFICANT CHANGE UP (ref 27–31)
MCHC RBC-ENTMCNC: 34.4 G/DL — SIGNIFICANT CHANGE UP (ref 32–37)
MCHC RBC-ENTMCNC: 35.1 G/DL — SIGNIFICANT CHANGE UP (ref 32–37)
MCV RBC AUTO: 84.4 FL — SIGNIFICANT CHANGE UP (ref 80–94)
MCV RBC AUTO: 85 FL — SIGNIFICANT CHANGE UP (ref 80–94)
MONOCYTES # BLD AUTO: 0.72 K/UL — HIGH (ref 0.1–0.6)
MONOCYTES # BLD AUTO: 0.95 K/UL — HIGH (ref 0.1–0.6)
MONOCYTES NFR BLD AUTO: 11.4 % — HIGH (ref 1.7–9.3)
MONOCYTES NFR BLD AUTO: 7.5 % — SIGNIFICANT CHANGE UP (ref 1.7–9.3)
NEUTROPHILS # BLD AUTO: 5.8 K/UL — SIGNIFICANT CHANGE UP (ref 1.4–6.5)
NEUTROPHILS # BLD AUTO: 7.93 K/UL — HIGH (ref 1.4–6.5)
NEUTROPHILS NFR BLD AUTO: 69.7 % — SIGNIFICANT CHANGE UP (ref 42.2–75.2)
NEUTROPHILS NFR BLD AUTO: 82.9 % — HIGH (ref 42.2–75.2)
NRBC # BLD: 0 /100 WBCS — SIGNIFICANT CHANGE UP (ref 0–0)
NRBC # BLD: 0 /100 WBCS — SIGNIFICANT CHANGE UP (ref 0–0)
PLATELET # BLD AUTO: 188 K/UL — SIGNIFICANT CHANGE UP (ref 130–400)
PLATELET # BLD AUTO: 193 K/UL — SIGNIFICANT CHANGE UP (ref 130–400)
POTASSIUM SERPL-MCNC: 3.7 MMOL/L — SIGNIFICANT CHANGE UP (ref 3.5–5)
POTASSIUM SERPL-SCNC: 3.7 MMOL/L — SIGNIFICANT CHANGE UP (ref 3.5–5)
PROT SERPL-MCNC: 6.3 G/DL — SIGNIFICANT CHANGE UP (ref 6–8)
PROTHROM AB SERPL-ACNC: 13.6 SEC — HIGH (ref 9.95–12.87)
PROTHROM AB SERPL-ACNC: 14.2 SEC — HIGH (ref 9.95–12.87)
RBC # BLD: 4.18 M/UL — LOW (ref 4.7–6.1)
RBC # BLD: 4.34 M/UL — LOW (ref 4.7–6.1)
RBC # FLD: 12.1 % — SIGNIFICANT CHANGE UP (ref 11.5–14.5)
RBC # FLD: 12.2 % — SIGNIFICANT CHANGE UP (ref 11.5–14.5)
SODIUM SERPL-SCNC: 138 MMOL/L — SIGNIFICANT CHANGE UP (ref 135–146)
WBC # BLD: 8.33 K/UL — SIGNIFICANT CHANGE UP (ref 4.8–10.8)
WBC # BLD: 9.57 K/UL — SIGNIFICANT CHANGE UP (ref 4.8–10.8)
WBC # FLD AUTO: 8.33 K/UL — SIGNIFICANT CHANGE UP (ref 4.8–10.8)
WBC # FLD AUTO: 9.57 K/UL — SIGNIFICANT CHANGE UP (ref 4.8–10.8)

## 2019-03-25 RX ORDER — HEPARIN SODIUM 5000 [USP'U]/ML
1100 INJECTION INTRAVENOUS; SUBCUTANEOUS
Qty: 25000 | Refills: 0 | Status: DISCONTINUED | OUTPATIENT
Start: 2019-03-25 | End: 2019-03-26

## 2019-03-25 RX ORDER — HEPARIN SODIUM 5000 [USP'U]/ML
1300 INJECTION INTRAVENOUS; SUBCUTANEOUS
Qty: 25000 | Refills: 0 | Status: DISCONTINUED | OUTPATIENT
Start: 2019-03-25 | End: 2019-03-25

## 2019-03-25 RX ORDER — VANCOMYCIN HCL 1 G
1000 VIAL (EA) INTRAVENOUS EVERY 12 HOURS
Qty: 0 | Refills: 0 | Status: DISCONTINUED | OUTPATIENT
Start: 2019-03-25 | End: 2019-03-26

## 2019-03-25 RX ORDER — DILTIAZEM HCL 120 MG
120 CAPSULE, EXT RELEASE 24 HR ORAL DAILY
Qty: 0 | Refills: 0 | Status: DISCONTINUED | OUTPATIENT
Start: 2019-03-25 | End: 2019-03-29

## 2019-03-25 RX ORDER — CEFEPIME 1 G/1
1000 INJECTION, POWDER, FOR SOLUTION INTRAMUSCULAR; INTRAVENOUS ONCE
Qty: 0 | Refills: 0 | Status: COMPLETED | OUTPATIENT
Start: 2019-03-25 | End: 2019-03-25

## 2019-03-25 RX ORDER — AMIODARONE HYDROCHLORIDE 400 MG/1
200 TABLET ORAL EVERY 8 HOURS
Qty: 0 | Refills: 0 | Status: DISCONTINUED | OUTPATIENT
Start: 2019-03-25 | End: 2019-03-27

## 2019-03-25 RX ORDER — ACETAMINOPHEN 500 MG
650 TABLET ORAL ONCE
Qty: 0 | Refills: 0 | Status: COMPLETED | OUTPATIENT
Start: 2019-03-25 | End: 2019-03-25

## 2019-03-25 RX ORDER — CEFEPIME 1 G/1
INJECTION, POWDER, FOR SOLUTION INTRAMUSCULAR; INTRAVENOUS
Qty: 0 | Refills: 0 | Status: DISCONTINUED | OUTPATIENT
Start: 2019-03-25 | End: 2019-03-26

## 2019-03-25 RX ORDER — CEFEPIME 1 G/1
1000 INJECTION, POWDER, FOR SOLUTION INTRAMUSCULAR; INTRAVENOUS EVERY 24 HOURS
Qty: 0 | Refills: 0 | Status: DISCONTINUED | OUTPATIENT
Start: 2019-03-26 | End: 2019-03-26

## 2019-03-25 RX ADMIN — HEPARIN SODIUM 13 UNIT(S)/HR: 5000 INJECTION INTRAVENOUS; SUBCUTANEOUS at 05:22

## 2019-03-25 RX ADMIN — Medication 2: at 08:02

## 2019-03-25 RX ADMIN — Medication 325 MILLIGRAM(S): at 05:21

## 2019-03-25 RX ADMIN — Medication 25 MILLIGRAM(S): at 05:35

## 2019-03-25 RX ADMIN — GABAPENTIN 100 MILLIGRAM(S): 400 CAPSULE ORAL at 05:35

## 2019-03-25 RX ADMIN — Medication 650 MILLIGRAM(S): at 02:42

## 2019-03-25 RX ADMIN — AMIODARONE HYDROCHLORIDE 200 MILLIGRAM(S): 400 TABLET ORAL at 13:30

## 2019-03-25 RX ADMIN — Medication 120 MILLIGRAM(S): at 11:55

## 2019-03-25 RX ADMIN — Medication 4: at 17:00

## 2019-03-25 RX ADMIN — CEFEPIME 100 MILLIGRAM(S): 1 INJECTION, POWDER, FOR SOLUTION INTRAMUSCULAR; INTRAVENOUS at 05:35

## 2019-03-25 RX ADMIN — GABAPENTIN 100 MILLIGRAM(S): 400 CAPSULE ORAL at 17:33

## 2019-03-25 RX ADMIN — Medication 250 MILLIGRAM(S): at 17:34

## 2019-03-25 RX ADMIN — Medication 1: at 11:56

## 2019-03-25 RX ADMIN — Medication 3 UNIT(S): at 17:01

## 2019-03-25 RX ADMIN — AMIODARONE HYDROCHLORIDE 200 MILLIGRAM(S): 400 TABLET ORAL at 21:58

## 2019-03-25 RX ADMIN — Medication 81 MILLIGRAM(S): at 11:54

## 2019-03-25 RX ADMIN — Medication 650 MILLIGRAM(S): at 00:16

## 2019-03-25 RX ADMIN — INSULIN GLARGINE 12 UNIT(S): 100 INJECTION, SOLUTION SUBCUTANEOUS at 21:16

## 2019-03-25 RX ADMIN — Medication 3 UNIT(S): at 08:02

## 2019-03-25 RX ADMIN — Medication 250 MILLIGRAM(S): at 05:35

## 2019-03-25 RX ADMIN — HEPARIN SODIUM 11 UNIT(S)/HR: 5000 INJECTION INTRAVENOUS; SUBCUTANEOUS at 15:18

## 2019-03-25 RX ADMIN — Medication 650 MILLIGRAM(S): at 22:55

## 2019-03-25 RX ADMIN — Medication 650 MILLIGRAM(S): at 15:46

## 2019-03-25 RX ADMIN — Medication 650 MILLIGRAM(S): at 21:16

## 2019-03-25 RX ADMIN — Medication 3 UNIT(S): at 11:56

## 2019-03-25 RX ADMIN — LOSARTAN POTASSIUM 100 MILLIGRAM(S): 100 TABLET, FILM COATED ORAL at 05:36

## 2019-03-25 NOTE — CONSULT NOTE ADULT - ASSESSMENT
IMPRESSION: Rehab of debility, diabetic peripheral neuropathy    PRECAUTIONS: [ x ] Cardiac  [  ] Respiratory  [  ] Seizures [  ] Contact Isolation  [  ] Droplet Isolation  [  ] Other    Weight Bearing Status:     RECOMMENDATION:    Out of Bed to Chair     DVT/Decubiti Prophylaxis    REHAB PLAN:     [xx   ] Bedside P/T 3-5 times a week   [   ]   Bedside O/T  2-3 times a week             [   ] No Rehab Therapy Indicated                   [   ]  Speech Therapy   Conditioning/ROM                                    ADL  Bed Mobility                                               Conditioning/ROM  Transfers                                                     Bed Mobility  Sitting /Standing Balance                         Transfers                                        Gait Training                                               Sitting/Standing Balance  Stair Training [   ]Applicable                    Home equipment Eval                                                                        Splinting  [   ] Only      GOALS:   ADL   [x   ]   Independent                    Transfers  [ x  ] Independent                          Ambulation  [x   ] Independent     [x    ] With device                            [ x  ]  CG                                                         [   ]  CG                                                                  [   ] CG                            [    ] Min A                                                   [   ] Min A                                                              [   ] Min  A          DISCHARGE PLAN:   [   ]  Good candidate for Intensive Rehabilitation/Hospital based-4A [facility]                                             Will tolerate 3hrs Intensive Rehab Daily                                       [ xx   ]  Short Term Rehab in Skilled Nursing Facility                                                                 VS                                     [ xx   ]  Home with Outpatient or VN services                                         [    ]  Possible Candidate for Intensive Hospital based Rehab

## 2019-03-25 NOTE — PROGRESS NOTE ADULT - SUBJECTIVE AND OBJECTIVE BOX
SUBJECTIVE:    Patient is a 78y old Male who presents with a chief complaint of unwitnessed fall and chest pain (24 Mar 2019 10:08)    Currently admitted to medicine with the primary diagnosis of Chest pain     Today is hospital day 2d. Pt spiked a temperature overnight , blood culture send. Will panculture him . Pt is minimally responsive. Will have to Monitor for signs of sepsis.    PAST MEDICAL & SURGICAL HISTORY  Neuropathy  HTN (hypertension)  Diabetes  No significant past surgical history    SOCIAL HISTORY:  Negative for smoking/alcohol/drug use.     ALLERGIES:  No Known Allergies    MEDICATIONS:  STANDING MEDICATIONS  amiodarone    Tablet 200 milliGRAM(s) Oral every 8 hours  aspirin  chewable 81 milliGRAM(s) Oral daily  cefepime   IVPB      dextrose 50% Injectable 12.5 Gram(s) IV Push once  dextrose 50% Injectable 25 Gram(s) IV Push once  dextrose 50% Injectable 25 Gram(s) IV Push once  diltiazem    milliGRAM(s) Oral daily  gabapentin 100 milliGRAM(s) Oral two times a day  heparin  Infusion 1100 Unit(s)/Hr IV Continuous <Continuous>  hydrochlorothiazide 25 milliGRAM(s) Oral daily  insulin glargine Injectable (LANTUS) 12 Unit(s) SubCutaneous at bedtime  insulin lispro (HumaLOG) corrective regimen sliding scale   SubCutaneous three times a day before meals  insulin lispro Injectable (HumaLOG) 3 Unit(s) SubCutaneous before breakfast  insulin lispro Injectable (HumaLOG) 3 Unit(s) SubCutaneous before lunch  insulin lispro Injectable (HumaLOG) 3 Unit(s) SubCutaneous before dinner  losartan 100 milliGRAM(s) Oral daily  vancomycin  IVPB 1000 milliGRAM(s) IV Intermittent every 12 hours    PRN MEDICATIONS  acetaminophen   Tablet .. 650 milliGRAM(s) Oral every 6 hours PRN  dextrose 40% Gel 15 Gram(s) Oral once PRN  glucagon  Injectable 1 milliGRAM(s) IntraMuscular once PRN    VITALS:   T(F): 103.1  HR: 113  BP: 112/68  RR: 18  SpO2: 96%    PHYSICAL EXAM:  PHYSICAL EXAM:  GENERAL: minimally responsive , pt is mute at baseline  HEAD:  Atraumatic, Normocephalic  EYES: EOMI,conjunctiva and sclera clear  NECK: Supple, No JVD  CHEST/LUNG: decreased air entry b/l  HEART: irregular heart rate, afib  ABDOMEN: Soft, Nontender, Nondistended; Bowel sounds present  EXTREMITIES:  2+ Peripheral Pulses, No clubbing, cyanosis, or edema  PSYCH: cannot evaluate , pt mute and non communicative        LABS:                        12.4   9.57  )-----------( 188      ( 25 Mar 2019 07:59 )             35.3     03    138  |  102  |  34<H>  ----------------------------<  206<H>  3.7   |  20  |  1.5    Ca    8.8      25 Mar 2019 07:59  Mg     1.8         TPro  6.3  /  Alb  4.1  /  TBili  0.5  /  DBili  <0.2  /  AST  226<H>  /  ALT  55<H>  /  AlkPhos  51  25    PT/INR - ( 25 Mar 2019 07:59 )   PT: 14.20 sec;   INR: 1.24 ratio         PTT - ( 25 Mar 2019 07:59 )  PTT:127.6 sec  Urinalysis Basic - ( 23 Mar 2019 17:25 )    Color: Yellow / Appearance: Clear / S.020 / pH: x  Gluc: x / Ketone: Trace  / Bili: Negative / Urobili: 1.0 mg/dL   Blood: x / Protein: 100 mg/dL / Nitrite: Negative   Leuk Esterase: Negative / RBC: 1-2 /HPF / WBC 1-2 /HPF   Sq Epi: x / Non Sq Epi: x / Bacteria: Few /HPF              Culture - Urine (collected 23 Mar 2019 17:25)  Source: .Urine Clean Catch (Midstream)  Final Report (24 Mar 2019 18:49):    <10,000 CFU/ml Normal Urogenital rashaad present      CARDIAC MARKERS ( 24 Mar 2019 11:09 )  x     / 0.03 ng/mL / x     / x     / x      CARDIAC MARKERS ( 24 Mar 2019 07:00 )  x     / 0.04 ng/mL / x     / x     / x      CARDIAC MARKERS ( 23 Mar 2019 16:50 )  x     / 0.03 ng/mL / x     / x     / x            RADIOLOGY: SUBJECTIVE:    THIS IS A POST DATED NOTE    the pt was seen and examined at approx 23:30, on 3/25/19; pts dtr bedside, spoke to another dtr on the phone as well   who had a few additional questions according to her sister.  All questions were answered to both.  they were concerned about him not eating enough and had been trying to encourage glucerna shakes  there were several bottles bedside.    Patient is a 78y old Male who presents with a chief complaint of unwitnessed fall and chest pain (24 Mar 2019 10:08)    Currently admitted to medicine with the primary diagnosis of Chest pain     Today is hospital day 2d. Pt spiked a temperature overnight , blood culture send. Will panculture him . Pt is minimally responsive. Will have to Monitor for signs of sepsis.    PAST MEDICAL & SURGICAL HISTORY  Neuropathy  HTN (hypertension)  Diabetes  No significant past surgical history    SOCIAL HISTORY:  Negative for smoking/alcohol/drug use.     ALLERGIES:  No Known Allergies    MEDICATIONS:  STANDING MEDICATIONS  amiodarone    Tablet 200 milliGRAM(s) Oral every 8 hours  aspirin  chewable 81 milliGRAM(s) Oral daily  cefepime   IVPB      dextrose 50% Injectable 12.5 Gram(s) IV Push once  dextrose 50% Injectable 25 Gram(s) IV Push once  dextrose 50% Injectable 25 Gram(s) IV Push once  diltiazem    milliGRAM(s) Oral daily  gabapentin 100 milliGRAM(s) Oral two times a day  heparin  Infusion 1100 Unit(s)/Hr IV Continuous <Continuous>  hydrochlorothiazide 25 milliGRAM(s) Oral daily  insulin glargine Injectable (LANTUS) 12 Unit(s) SubCutaneous at bedtime  insulin lispro (HumaLOG) corrective regimen sliding scale   SubCutaneous three times a day before meals  insulin lispro Injectable (HumaLOG) 3 Unit(s) SubCutaneous before breakfast  insulin lispro Injectable (HumaLOG) 3 Unit(s) SubCutaneous before lunch  insulin lispro Injectable (HumaLOG) 3 Unit(s) SubCutaneous before dinner  losartan 100 milliGRAM(s) Oral daily  vancomycin  IVPB 1000 milliGRAM(s) IV Intermittent every 12 hours    PRN MEDICATIONS  acetaminophen   Tablet .. 650 milliGRAM(s) Oral every 6 hours PRN  dextrose 40% Gel 15 Gram(s) Oral once PRN  glucagon  Injectable 1 milliGRAM(s) IntraMuscular once PRN    VITALS:   T(F): 103.1  HR: 113  BP: 112/68  RR: 18  SpO2: 96%    PHYSICAL EXAM:  PHYSICAL EXAM:  GENERAL: minimally responsive , pt is mute at baseline  HEAD:  Atraumatic, Normocephalic  EYES: EOMI,conjunctiva and sclera clear  NECK: Supple, No JVD  CHEST/LUNG: decreased air entry b/l  HEART: irregular heart rate, afib  ABDOMEN: Soft, Nontender, Nondistended; Bowel sounds present  EXTREMITIES:  2+ Peripheral Pulses, No clubbing, cyanosis, or edema  PSYCH: pt does answer simple questions as long as you look at him while speaking        LABS:                        12.4   9.57  )-----------( 188      ( 25 Mar 2019 07:59 )             35.3     03-25    138  |  102  |  34<H>  ----------------------------<  206<H>  3.7   |  20  |  1.5    Ca    8.8      25 Mar 2019 07:59  Mg     1.8     03-25    TPro  6.3  /  Alb  4.1  /  TBili  0.5  /  DBili  <0.2  /  AST  226<H>  /  ALT  55<H>  /  AlkPhos  51  03-25    PT/INR - ( 25 Mar 2019 07:59 )   PT: 14.20 sec;   INR: 1.24 ratio         PTT - ( 25 Mar 2019 07:59 )  PTT:127.6 sec  Urinalysis Basic - ( 23 Mar 2019 17:25 )    Color: Yellow / Appearance: Clear / S.020 / pH: x  Gluc: x / Ketone: Trace  / Bili: Negative / Urobili: 1.0 mg/dL   Blood: x / Protein: 100 mg/dL / Nitrite: Negative   Leuk Esterase: Negative / RBC: 1-2 /HPF / WBC 1-2 /HPF   Sq Epi: x / Non Sq Epi: x / Bacteria: Few /HPF              Culture - Urine (collected 23 Mar 2019 17:25)  Source: .Urine Clean Catch (Midstream)  Final Report (24 Mar 2019 18:49):    <10,000 CFU/ml Normal Urogenital rashaad present      CARDIAC MARKERS ( 24 Mar 2019 11:09 )  x     / 0.03 ng/mL / x     / x     / x      CARDIAC MARKERS ( 24 Mar 2019 07:00 )  x     / 0.04 ng/mL / x     / x     / x      CARDIAC MARKERS ( 23 Mar 2019 16:50 )  x     / 0.03 ng/mL / x     / x     / x            RADIOLOGY:

## 2019-03-25 NOTE — CONSULT NOTE ADULT - SUBJECTIVE AND OBJECTIVE BOX
HPI:  74 y m who is [place], deaf and mute, htn, dm, diabetic neuropathy, high psa but no workup was done presented with unwitnessed fall. As per daughter who is hcp, he uses cane and bike at baseline, he was refusing to work with rehab before. he was found to be weak since yesterday night and usually he needs assist to get up. today morning he was sleeping till 1 then when they went to wake him up, he was up  and they were waiting for him to get dresssed up but it took him more than 10min, so they went to check on him and found him on the floor unable to get up. when asked in the ed, he mentioned that he has chest pain for 1 week which is intermittent and non radiating , not associated with cough or sob.   family essentially says unable to take care of him (23 Mar 2019 22:50)      PAST MEDICAL & SURGICAL HISTORY:  Neuropathy  HTN (hypertension)  Diabetes  No significant past surgical history      Hospital Course:  He uses a scooter for distances. He has diabetic neuropathy.  He has had diabetes for many years. He had poor control for years because of noncompliance.  TODAY'S SUBJECTIVE & REVIEW OF SYMPTOMS:     Constitutional WNL   Cardio WNL   Resp WNL   GI WNL  Heme WNL  Endo WNL  Skin WNL  MSK WNL  Neuro WNL  Cognitive WNL  Psych WNL      MEDICATIONS  (STANDING):  amiodarone    Tablet 200 milliGRAM(s) Oral every 8 hours  aspirin  chewable 81 milliGRAM(s) Oral daily  cefepime   IVPB      dextrose 50% Injectable 12.5 Gram(s) IV Push once  dextrose 50% Injectable 25 Gram(s) IV Push once  dextrose 50% Injectable 25 Gram(s) IV Push once  diltiazem    milliGRAM(s) Oral daily  gabapentin 100 milliGRAM(s) Oral two times a day  heparin  Infusion 1100 Unit(s)/Hr (11 mL/Hr) IV Continuous <Continuous>  hydrochlorothiazide 25 milliGRAM(s) Oral daily  insulin glargine Injectable (LANTUS) 12 Unit(s) SubCutaneous at bedtime  insulin lispro (HumaLOG) corrective regimen sliding scale   SubCutaneous three times a day before meals  insulin lispro Injectable (HumaLOG) 3 Unit(s) SubCutaneous before breakfast  insulin lispro Injectable (HumaLOG) 3 Unit(s) SubCutaneous before lunch  insulin lispro Injectable (HumaLOG) 3 Unit(s) SubCutaneous before dinner  losartan 100 milliGRAM(s) Oral daily  vancomycin  IVPB 1000 milliGRAM(s) IV Intermittent every 12 hours    MEDICATIONS  (PRN):  acetaminophen   Tablet .. 650 milliGRAM(s) Oral every 6 hours PRN Temp greater or equal to 38C (100.4F)  dextrose 40% Gel 15 Gram(s) Oral once PRN Blood Glucose LESS THAN 70 milliGRAM(s)/deciliter  glucagon  Injectable 1 milliGRAM(s) IntraMuscular once PRN Glucose LESS THAN 70 milligrams/deciliter      FAMILY HISTORY:  No pertinent family history in first degree relatives      Allergies    No Known Allergies    Intolerances        SOCIAL HISTORY:    [  ] Etoh  [  ] Smoking  [  ] Substance abuse     Home Environment:  [  ] Home Alone  [  ] Lives with Family  [  ] Home Health Aid    Dwelling:  [  ] Apartment  [  ] Private House  [  ] Adult Home  [  ] Skilled Nursing Facility      [  ] Short Term  [  ] Long Term  [  ] Stairs       Elevator [  ]    FUNCTIONAL STATUS PTA: (Check all that apply)  Ambulation: [   ]Independent    [  ] Dependent     [  ] Non-Ambulatory  Assistive Device: [  ] SA Cane  [  ]  Q Cane  [  ] Walker  [  ]  Wheelchair  ADL : [  ] Independent  [  ]  Dependent       Vital Signs Last 24 Hrs  T(C): 36.7 (25 Mar 2019 14:17), Max: 39.5 (25 Mar 2019 06:38)  T(F): 98.1 (25 Mar 2019 14:17), Max: 103.1 (25 Mar 2019 06:38)  HR: 101 (25 Mar 2019 14:17) (55 - 113)  BP: 137/56 (25 Mar 2019 14:17) (112/68 - 137/56)  BP(mean): --  RR: 19 (25 Mar 2019 14:17) (18 - 20)  SpO2: --      PHYSICAL EXAM: Alert   GENERAL: NAD, well-groomed, well-developed  HEAD:  Atraumatic, Normocephalic  EYES: EOMI, PERRLA, conjunctiva and sclera clear  NECK: Supple, No JVD, Normal thyroid  CHEST/LUNG: Clear to percussion bilaterally; No rales, rhonchi, wheezing, or rubs  HEART: Regular rate and rhythm; No murmurs, rubs, or gallops  ABDOMEN: Soft, Nontender, Nondistended; Bowel sounds present  EXTREMITIES:  2+ Peripheral Pulses, No clubbing, cyanosis, or edema    NERVOUS SYSTEM:  Cranial Nerves 2-12 intact [  ] Abnormal  [  ]  ROM: WFL all extremities [  ]  Abnormal [  ]  Motor Strength: WFL all extremities  [  ]  Abnormal [x  ] 4/5  Sensation: intact to light touch [  ] Abnormal [  ]  Reflexes: Symmetric [  ]  Abnormal [  ]    FUNCTIONAL STATUS:  Bed Mobility: Independent [  ]  Supervision [  ]  Needs Assistance [  ]  N/A [  ]  Transfers: Independent [  ]  Supervision [  ]  Needs Assistance [  ]  N/A [  ]   Ambulation: Independent [  ]  Supervision [  ]  Needs Assistance [  ]  N/A [  ]  ADL: Independent [  ] Requires Assistance [  ] N/A [  ]      LABS:                        12.4   9.57  )-----------( 188      ( 25 Mar 2019 07:59 )             35.3     03-25    138  |  102  |  34<H>  ----------------------------<  206<H>  3.7   |  20  |  1.5    Ca    8.8      25 Mar 2019 07:59  Mg     1.8     03-25    TPro  6.3  /  Alb  4.1  /  TBili  0.5  /  DBili  <0.2  /  AST  226<H>  /  ALT  55<H>  /  AlkPhos  51  03-25    PT/INR - ( 25 Mar 2019 07:59 )   PT: 14.20 sec;   INR: 1.24 ratio         PTT - ( 25 Mar 2019 12:07 )  PTT:185.0 sec      RADIOLOGY & ADDITIONAL STUDIES:    Assesment:

## 2019-03-25 NOTE — PROGRESS NOTE ADULT - ASSESSMENT
74 y m who is [place] deaf and mute, htn, dm, diabetic neuropathy, high psa but no workup was done presented with unwitnessed fall      #) rule out sepsis  - panculture  - Cefepime , vancomycin  - chest xray ok  - Monitor for signs of sepsis  - GOC conversation initiated with daughter      #) New onset afib  -  mg OD , load amiodarone 200 mg q 8  - High Chadvasc , heparin drip  - tele monitoring     # Unwitnessed fall - likely due to progressive deconditioning 2/2 pt not ambulating for months  - check orthostatics  - trops neg x2  - EKG: LBBB (LBBB is BASELINE)  - f/u ECHO  - fall risk precautions  - will c/w gabapentin  - pt rehab eval when more stable    # Transaminitis  - US Abdomen Limited (03.23.19): No sonographic evidence for cholelithiasis or cholecystitis. 5 mm gallbladder wall polyp.  - no hx of alcohol use  - will d/c atorvastatin   - follow up hepatitis panel  - monitor lfts    # HTN  - c/w losartan and hctz    # DM2   - monitor fingerstick   - start insulin if FS>200    # Hematuria   - hx of high psa   - enlarged prostate on scan    # DVT PPX  - c/w enoxaparin     # DASH / Carb consistent diet     # Disposition   Extremely poor progrnosis.  GOC initiated 74 y m who is [place]og[place] deaf and mute, htn, dm, diabetic neuropathy, high psa but no workup was done presented with unwitnessed fall      #) rule out sepsis  - panculture  - Cefepime , vancomycin  - chest xray ok  - Monitor for signs of sepsis  - GOC conversation initiated with daughter      #) New onset afib  -  mg OD , load amiodarone 200 mg q 8  - High Chadvasc , heparin drip  - tele monitoring     # Unwitnessed fall - likely due to progressive deconditioning 2/2 pt not ambulating for months  - check orthostatics  - trops neg x2  - EKG: LBBB (LBBB is BASELINE)  - f/u ECHO  - fall risk precautions  - will c/w gabapentin  - pt rehab eval when more stable    # Transaminitis  - US Abdomen Limited (03.23.19): No sonographic evidence for cholelithiasis or cholecystitis. 5 mm gallbladder wall polyp.  - no hx of alcohol use  - will d/c atorvastatin   - follow up hepatitis panel  - monitor lfts tino in view of amiodarone    # HTN  - c/w losartan and hctz    # DM2   - monitor fingerstick   - start insulin if FS>200    # Hematuria   - hx of high psa   - enlarged prostate on scan    # DVT PPX  - c/w enoxaparin     # DASH / Carb consistent diet

## 2019-03-26 LAB
ANION GAP SERPL CALC-SCNC: 15 MMOL/L — HIGH (ref 7–14)
APPEARANCE UR: ABNORMAL
APTT BLD: 63.9 SEC — HIGH (ref 27–39.2)
APTT BLD: 79.4 SEC — CRITICAL HIGH (ref 27–39.2)
APTT BLD: 87.7 SEC — CRITICAL HIGH (ref 27–39.2)
BACTERIA # UR AUTO: ABNORMAL /HPF
BILIRUB UR-MCNC: ABNORMAL
BUN SERPL-MCNC: 47 MG/DL — HIGH (ref 10–20)
CALCIUM SERPL-MCNC: 8.7 MG/DL — SIGNIFICANT CHANGE UP (ref 8.5–10.1)
CHLORIDE SERPL-SCNC: 100 MMOL/L — SIGNIFICANT CHANGE UP (ref 98–110)
CO2 SERPL-SCNC: 23 MMOL/L — SIGNIFICANT CHANGE UP (ref 17–32)
COLOR SPEC: SIGNIFICANT CHANGE UP
CREAT SERPL-MCNC: 1.8 MG/DL — HIGH (ref 0.7–1.5)
DIFF PNL FLD: ABNORMAL
GLUCOSE BLDC GLUCOMTR-MCNC: 159 MG/DL — HIGH (ref 70–99)
GLUCOSE BLDC GLUCOMTR-MCNC: 162 MG/DL — HIGH (ref 70–99)
GLUCOSE BLDC GLUCOMTR-MCNC: 177 MG/DL — HIGH (ref 70–99)
GLUCOSE BLDC GLUCOMTR-MCNC: 182 MG/DL — HIGH (ref 70–99)
GLUCOSE SERPL-MCNC: 175 MG/DL — HIGH (ref 70–99)
GLUCOSE UR QL: 250
GRAN CASTS # UR COMP ASSIST: ABNORMAL /LPF
HCT VFR BLD CALC: 35.1 % — LOW (ref 42–52)
HGB BLD-MCNC: 11.9 G/DL — LOW (ref 14–18)
KETONES UR-MCNC: ABNORMAL
LEUKOCYTE ESTERASE UR-ACNC: NEGATIVE — SIGNIFICANT CHANGE UP
MCHC RBC-ENTMCNC: 29 PG — SIGNIFICANT CHANGE UP (ref 27–31)
MCHC RBC-ENTMCNC: 33.9 G/DL — SIGNIFICANT CHANGE UP (ref 32–37)
MCV RBC AUTO: 85.4 FL — SIGNIFICANT CHANGE UP (ref 80–94)
NITRITE UR-MCNC: NEGATIVE — SIGNIFICANT CHANGE UP
NRBC # BLD: 0 /100 WBCS — SIGNIFICANT CHANGE UP (ref 0–0)
PH UR: 5.5 — SIGNIFICANT CHANGE UP (ref 5–8)
PLATELET # BLD AUTO: 182 K/UL — SIGNIFICANT CHANGE UP (ref 130–400)
POTASSIUM SERPL-MCNC: 3.7 MMOL/L — SIGNIFICANT CHANGE UP (ref 3.5–5)
POTASSIUM SERPL-SCNC: 3.7 MMOL/L — SIGNIFICANT CHANGE UP (ref 3.5–5)
PROT UR-MCNC: 100
RBC # BLD: 4.11 M/UL — LOW (ref 4.7–6.1)
RBC # FLD: 12.1 % — SIGNIFICANT CHANGE UP (ref 11.5–14.5)
RBC CASTS # UR COMP ASSIST: ABNORMAL /HPF
SODIUM SERPL-SCNC: 138 MMOL/L — SIGNIFICANT CHANGE UP (ref 135–146)
SP GR SPEC: >=1.03 — SIGNIFICANT CHANGE UP (ref 1.01–1.03)
UROBILINOGEN FLD QL: 0.2 — SIGNIFICANT CHANGE UP (ref 0.2–0.2)
VANCOMYCIN TROUGH SERPL-MCNC: 18.1 UG/ML — HIGH (ref 5–10)
WBC # BLD: 9.96 K/UL — SIGNIFICANT CHANGE UP (ref 4.8–10.8)
WBC # FLD AUTO: 9.96 K/UL — SIGNIFICANT CHANGE UP (ref 4.8–10.8)

## 2019-03-26 RX ORDER — HEPARIN SODIUM 5000 [USP'U]/ML
800 INJECTION INTRAVENOUS; SUBCUTANEOUS
Qty: 25000 | Refills: 0 | Status: DISCONTINUED | OUTPATIENT
Start: 2019-03-26 | End: 2019-03-27

## 2019-03-26 RX ORDER — SODIUM CHLORIDE 9 MG/ML
1000 INJECTION INTRAMUSCULAR; INTRAVENOUS; SUBCUTANEOUS
Qty: 0 | Refills: 0 | Status: DISCONTINUED | OUTPATIENT
Start: 2019-03-26 | End: 2019-03-29

## 2019-03-26 RX ADMIN — AMIODARONE HYDROCHLORIDE 200 MILLIGRAM(S): 400 TABLET ORAL at 17:18

## 2019-03-26 RX ADMIN — LOSARTAN POTASSIUM 100 MILLIGRAM(S): 100 TABLET, FILM COATED ORAL at 05:13

## 2019-03-26 RX ADMIN — SODIUM CHLORIDE 70 MILLILITER(S): 9 INJECTION INTRAMUSCULAR; INTRAVENOUS; SUBCUTANEOUS at 13:59

## 2019-03-26 RX ADMIN — HEPARIN SODIUM 8 UNIT(S)/HR: 5000 INJECTION INTRAVENOUS; SUBCUTANEOUS at 07:44

## 2019-03-26 RX ADMIN — INSULIN GLARGINE 12 UNIT(S): 100 INJECTION, SOLUTION SUBCUTANEOUS at 21:07

## 2019-03-26 RX ADMIN — GABAPENTIN 100 MILLIGRAM(S): 400 CAPSULE ORAL at 17:20

## 2019-03-26 RX ADMIN — Medication 81 MILLIGRAM(S): at 11:18

## 2019-03-26 RX ADMIN — SODIUM CHLORIDE 100 MILLILITER(S): 9 INJECTION INTRAMUSCULAR; INTRAVENOUS; SUBCUTANEOUS at 22:17

## 2019-03-26 RX ADMIN — Medication 120 MILLIGRAM(S): at 05:13

## 2019-03-26 RX ADMIN — Medication 650 MILLIGRAM(S): at 06:43

## 2019-03-26 RX ADMIN — Medication 25 MILLIGRAM(S): at 05:13

## 2019-03-26 RX ADMIN — HEPARIN SODIUM 8 UNIT(S)/HR: 5000 INJECTION INTRAVENOUS; SUBCUTANEOUS at 22:17

## 2019-03-26 RX ADMIN — Medication 3 UNIT(S): at 07:38

## 2019-03-26 RX ADMIN — Medication 650 MILLIGRAM(S): at 23:00

## 2019-03-26 RX ADMIN — Medication 650 MILLIGRAM(S): at 05:13

## 2019-03-26 RX ADMIN — AMIODARONE HYDROCHLORIDE 200 MILLIGRAM(S): 400 TABLET ORAL at 21:07

## 2019-03-26 RX ADMIN — Medication 650 MILLIGRAM(S): at 21:15

## 2019-03-26 RX ADMIN — CEFEPIME 100 MILLIGRAM(S): 1 INJECTION, POWDER, FOR SOLUTION INTRAMUSCULAR; INTRAVENOUS at 05:14

## 2019-03-26 RX ADMIN — GABAPENTIN 100 MILLIGRAM(S): 400 CAPSULE ORAL at 05:13

## 2019-03-26 RX ADMIN — AMIODARONE HYDROCHLORIDE 200 MILLIGRAM(S): 400 TABLET ORAL at 05:13

## 2019-03-26 RX ADMIN — HEPARIN SODIUM 8 UNIT(S)/HR: 5000 INJECTION INTRAVENOUS; SUBCUTANEOUS at 02:52

## 2019-03-26 RX ADMIN — Medication 250 MILLIGRAM(S): at 05:13

## 2019-03-26 RX ADMIN — Medication 1: at 07:37

## 2019-03-26 NOTE — PROGRESS NOTE ADULT - SUBJECTIVE AND OBJECTIVE BOX
SUBJECTIVE:    Patient is a 78y old Male who presents with a chief complaint of unwitnessed fall and chest pain (25 Mar 2019 18:17)    Currently admitted to medicine with the primary diagnosis of Chest pain     Today is hospital day 3d. This morning he is resting comfortably in bed and reports no new issues or overnight events.     PAST MEDICAL & SURGICAL HISTORY  Neuropathy  HTN (hypertension)  Diabetes  No significant past surgical history    SOCIAL HISTORY:  Negative for smoking/alcohol/drug use.     ALLERGIES:  No Known Allergies    MEDICATIONS:  STANDING MEDICATIONS  amiodarone    Tablet 200 milliGRAM(s) Oral every 8 hours  aspirin  chewable 81 milliGRAM(s) Oral daily  dextrose 50% Injectable 12.5 Gram(s) IV Push once  dextrose 50% Injectable 25 Gram(s) IV Push once  dextrose 50% Injectable 25 Gram(s) IV Push once  diltiazem    milliGRAM(s) Oral daily  gabapentin 100 milliGRAM(s) Oral two times a day  heparin  Infusion 800 Unit(s)/Hr IV Continuous <Continuous>  hydrochlorothiazide 25 milliGRAM(s) Oral daily  insulin glargine Injectable (LANTUS) 12 Unit(s) SubCutaneous at bedtime  insulin lispro (HumaLOG) corrective regimen sliding scale   SubCutaneous three times a day before meals  insulin lispro Injectable (HumaLOG) 3 Unit(s) SubCutaneous before breakfast  insulin lispro Injectable (HumaLOG) 3 Unit(s) SubCutaneous before lunch  insulin lispro Injectable (HumaLOG) 3 Unit(s) SubCutaneous before dinner  losartan 100 milliGRAM(s) Oral daily    PRN MEDICATIONS  acetaminophen   Tablet .. 650 milliGRAM(s) Oral every 6 hours PRN  dextrose 40% Gel 15 Gram(s) Oral once PRN  glucagon  Injectable 1 milliGRAM(s) IntraMuscular once PRN    VITALS:   T(F): 99.3  HR: 82  BP: 124/58  RR: 18  SpO2: --    PHYSICAL EXAM:  GEN: No acute distress  LUNGS: decreased air entry b/l   HEART: S1/S2 present.    ABD: Soft, non-tender, non-distended.    NEURO: hard to assess pt deaf and mute      LABS:                        12.4   9.57  )-----------( 188      ( 25 Mar 2019 07:59 )             35.3     03-25    138  |  102  |  34<H>  ----------------------------<  206<H>  3.7   |  20  |  1.5    Ca    8.8      25 Mar 2019 07:59  Mg     1.8     03-25    TPro  6.3  /  Alb  4.1  /  TBili  0.5  /  DBili  <0.2  /  AST  226<H>  /  ALT  55<H>  /  AlkPhos  51  03-25    PT/INR - ( 25 Mar 2019 07:59 )   PT: 14.20 sec;   INR: 1.24 ratio         PTT - ( 26 Mar 2019 01:08 )  PTT:79.4 sec  Urinalysis Basic - ( 25 Mar 2019 09:44 )    Color: Dark Yellow / Appearance: Turbid / SG: >=1.030 / pH: x  Gluc: x / Ketone: Trace  / Bili: Small / Urobili: 0.2   Blood: x / Protein: 100 / Nitrite: Negative   Leuk Esterase: Negative / RBC: 11-25 /HPF / WBC x   Sq Epi: x / Non Sq Epi: x / Bacteria: Few /HPF              Culture - Blood (collected 24 Mar 2019 11:22)  Source: .Blood Blood-Peripheral  Preliminary Report (25 Mar 2019 17:01):    No growth to date.    Culture - Urine (collected 23 Mar 2019 17:25)  Source: .Urine Clean Catch (Midstream)  Final Report (24 Mar 2019 18:49):    <10,000 CFU/ml Normal Urogenital rashaad present      CARDIAC MARKERS ( 24 Mar 2019 11:09 )  x     / 0.03 ng/mL / x     / x     / x            RADIOLOGY: SUBJECTIVE:    Patient is a 78y old Male who presents with a chief complaint of unwitnessed fall and chest pain (25 Mar 2019 18:17)    Currently admitted to medicine with the primary diagnosis of Chest pain     Today is hospital day 3d. This morning he is resting comfortably in bed and reports no new issues or overnight events.   doing better    PAST MEDICAL & SURGICAL HISTORY  Neuropathy  HTN (hypertension)  Diabetes  No significant past surgical history    SOCIAL HISTORY:  Negative for smoking/alcohol/drug use.     ALLERGIES:  No Known Allergies    MEDICATIONS:  STANDING MEDICATIONS  amiodarone    Tablet 200 milliGRAM(s) Oral every 8 hours  aspirin  chewable 81 milliGRAM(s) Oral daily  dextrose 50% Injectable 12.5 Gram(s) IV Push once  dextrose 50% Injectable 25 Gram(s) IV Push once  dextrose 50% Injectable 25 Gram(s) IV Push once  diltiazem    milliGRAM(s) Oral daily  gabapentin 100 milliGRAM(s) Oral two times a day  heparin  Infusion 800 Unit(s)/Hr IV Continuous <Continuous>  hydrochlorothiazide 25 milliGRAM(s) Oral daily  insulin glargine Injectable (LANTUS) 12 Unit(s) SubCutaneous at bedtime  insulin lispro (HumaLOG) corrective regimen sliding scale   SubCutaneous three times a day before meals  insulin lispro Injectable (HumaLOG) 3 Unit(s) SubCutaneous before breakfast  insulin lispro Injectable (HumaLOG) 3 Unit(s) SubCutaneous before lunch  insulin lispro Injectable (HumaLOG) 3 Unit(s) SubCutaneous before dinner  losartan 100 milliGRAM(s) Oral daily    PRN MEDICATIONS  acetaminophen   Tablet .. 650 milliGRAM(s) Oral every 6 hours PRN  dextrose 40% Gel 15 Gram(s) Oral once PRN  glucagon  Injectable 1 milliGRAM(s) IntraMuscular once PRN    VITALS:   T(F): 99.3  HR: 82  BP: 124/58  RR: 18  SpO2: --    PHYSICAL EXAM:  GEN: No acute distress  LUNGS: decreased air entry b/l   HEART: S1/S2 present.    ABD: Soft, non-tender, non-distended.    NEURO: hard to assess pt deaf and mute      LABS:                        12.4   9.57  )-----------( 188      ( 25 Mar 2019 07:59 )             35.3     03-25    138  |  102  |  34<H>  ----------------------------<  206<H>  3.7   |  20  |  1.5    Ca    8.8      25 Mar 2019 07:59  Mg     1.8     03-25    TPro  6.3  /  Alb  4.1  /  TBili  0.5  /  DBili  <0.2  /  AST  226<H>  /  ALT  55<H>  /  AlkPhos  51  03-25    PT/INR - ( 25 Mar 2019 07:59 )   PT: 14.20 sec;   INR: 1.24 ratio         PTT - ( 26 Mar 2019 01:08 )  PTT:79.4 sec  Urinalysis Basic - ( 25 Mar 2019 09:44 )    Color: Dark Yellow / Appearance: Turbid / SG: >=1.030 / pH: x  Gluc: x / Ketone: Trace  / Bili: Small / Urobili: 0.2   Blood: x / Protein: 100 / Nitrite: Negative   Leuk Esterase: Negative / RBC: 11-25 /HPF / WBC x   Sq Epi: x / Non Sq Epi: x / Bacteria: Few /HPF              Culture - Blood (collected 24 Mar 2019 11:22)  Source: .Blood Blood-Peripheral  Preliminary Report (25 Mar 2019 17:01):    No growth to date.    Culture - Urine (collected 23 Mar 2019 17:25)  Source: .Urine Clean Catch (Midstream)  Final Report (24 Mar 2019 18:49):    <10,000 CFU/ml Normal Urogenital rashaad present      CARDIAC MARKERS ( 24 Mar 2019 11:09 )  x     / 0.03 ng/mL / x     / x     / x            RADIOLOGY:

## 2019-03-26 NOTE — CHART NOTE - NSCHARTNOTEFT_GEN_A_CORE
Registered Dietitian Note    Pt's daughter requesting Glucerna shakes as pt is not eating much solid foods at this time. Pt is a new admission from ER. Currently on CHO consistent diet (no snacks).   Chart reviewed. Case discussed w/ Dr. Del Rio #[id number].   Diet to be changed to CHO consistent w/HS snacks + GLucerna shakes w/ meals.     RD will monitor PO intake.

## 2019-03-26 NOTE — PROGRESS NOTE ADULT - ASSESSMENT
74 y m who is [place] deaf and mute, htn, dm, diabetic neuropathy, high psa but no workup was done presented with unwitnessed fall      #) rule out sepsis  - b/c negative on 3/24 , UA not convincing, hematuria on UA  - de escelate antibiotcs  - chest xray ok  - Monitor for signs of sepsis  - GOC conversation initiated with daughter      #) New onset afib , pt back in sinus  -  mg OD , load amiodarone 200 mg q 8  - High Chadvasc , heparin drip , need to transition to NOAC  - tele monitoring     # Unwitnessed fall - likely due to progressive deconditioning 2/2 pt not ambulating for months  - check orthostatics  - trops neg x2  - EKG: LBBB (LBBB is BASELINE)  - f/u ECHO  - fall risk precautions  - will c/w gabapentin  - pt rehab eval when more stable, physiatry on board     # Transaminitis  - US Abdomen Limited (03.23.19): No sonographic evidence for cholelithiasis or cholecystitis. 5 mm gallbladder wall polyp.  - no hx of alcohol use  - will d/c atorvastatin   - follow up hepatitis panel  - monitor lfts    # HTN  - c/w losartan and hctz    # DM2   - monitor fingerstick   - start insulin if FS>200    # Hematuria   - hx of high psa   - enlarged prostate on scan    # DVT PPX  - c/w enoxaparin     # DASH / Carb consistent diet     # Disposition   Extremely poor prognosis  GOC initiated 74 y m who is [place]ian deaf and mute, htn, dm, diabetic neuropathy, high psa but no workup was done presented with unwitnessed fall      #) rule out sepsis  - b/c negative on 3/24 , UA not convincing, hematuria on UA  - de escelate antibiotcs  - chest xray ok  - Monitor for signs of sepsis  - GOC conversation initiated with daughter      #) New onset afib , pt back in sinus  -  mg OD , decrease Amiodarone to 200mg po q24 in am  - d/c heparin in am and start Eliquis 5mg po q12  - tele monitoring  for now but will d/c tele in am if still in SR    # Unwitnessed fall - likely due to progressive deconditioning 2/2 pt not ambulating for months  rehab and assess for inpt adm    # Transaminitis  recheck in am    # HTN  - c/w losartan and hctz    # DM2   - monitor fingerstick   - start insulin if FS>200    # Hematuria   - hx of high psa   - enlarged prostate on scan    # DVT PPX  - c/w enoxaparin     ##### Worsening renal function - d/c HCTZ, IVF x24 hrs

## 2019-03-27 LAB
ANION GAP SERPL CALC-SCNC: 13 MMOL/L — SIGNIFICANT CHANGE UP (ref 7–14)
APTT BLD: 36 SEC — SIGNIFICANT CHANGE UP (ref 27–39.2)
APTT BLD: 76.4 SEC — CRITICAL HIGH (ref 27–39.2)
BUN SERPL-MCNC: 49 MG/DL — HIGH (ref 10–20)
CALCIUM SERPL-MCNC: 8.2 MG/DL — LOW (ref 8.5–10.1)
CHLORIDE SERPL-SCNC: 101 MMOL/L — SIGNIFICANT CHANGE UP (ref 98–110)
CO2 SERPL-SCNC: 23 MMOL/L — SIGNIFICANT CHANGE UP (ref 17–32)
CREAT SERPL-MCNC: 1.7 MG/DL — HIGH (ref 0.7–1.5)
CULTURE RESULTS: NO GROWTH — SIGNIFICANT CHANGE UP
GLUCOSE BLDC GLUCOMTR-MCNC: 160 MG/DL — HIGH (ref 70–99)
GLUCOSE BLDC GLUCOMTR-MCNC: 169 MG/DL — HIGH (ref 70–99)
GLUCOSE BLDC GLUCOMTR-MCNC: 176 MG/DL — HIGH (ref 70–99)
GLUCOSE SERPL-MCNC: 203 MG/DL — HIGH (ref 70–99)
HCT VFR BLD CALC: 30.2 % — LOW (ref 42–52)
HGB BLD-MCNC: 10.5 G/DL — LOW (ref 14–18)
MCHC RBC-ENTMCNC: 29.4 PG — SIGNIFICANT CHANGE UP (ref 27–31)
MCHC RBC-ENTMCNC: 34.8 G/DL — SIGNIFICANT CHANGE UP (ref 32–37)
MCV RBC AUTO: 84.6 FL — SIGNIFICANT CHANGE UP (ref 80–94)
NRBC # BLD: 0 /100 WBCS — SIGNIFICANT CHANGE UP (ref 0–0)
PLATELET # BLD AUTO: 173 K/UL — SIGNIFICANT CHANGE UP (ref 130–400)
POTASSIUM SERPL-MCNC: 4.1 MMOL/L — SIGNIFICANT CHANGE UP (ref 3.5–5)
POTASSIUM SERPL-SCNC: 4.1 MMOL/L — SIGNIFICANT CHANGE UP (ref 3.5–5)
RBC # BLD: 3.57 M/UL — LOW (ref 4.7–6.1)
RBC # FLD: 12.2 % — SIGNIFICANT CHANGE UP (ref 11.5–14.5)
SODIUM SERPL-SCNC: 137 MMOL/L — SIGNIFICANT CHANGE UP (ref 135–146)
SPECIMEN SOURCE: SIGNIFICANT CHANGE UP
WBC # BLD: 5.57 K/UL — SIGNIFICANT CHANGE UP (ref 4.8–10.8)
WBC # FLD AUTO: 5.57 K/UL — SIGNIFICANT CHANGE UP (ref 4.8–10.8)

## 2019-03-27 RX ORDER — SENNA PLUS 8.6 MG/1
2 TABLET ORAL AT BEDTIME
Qty: 0 | Refills: 0 | Status: DISCONTINUED | OUTPATIENT
Start: 2019-03-27 | End: 2019-03-29

## 2019-03-27 RX ORDER — DOCUSATE SODIUM 100 MG
100 CAPSULE ORAL THREE TIMES A DAY
Qty: 0 | Refills: 0 | Status: DISCONTINUED | OUTPATIENT
Start: 2019-03-27 | End: 2019-03-29

## 2019-03-27 RX ORDER — APIXABAN 2.5 MG/1
5 TABLET, FILM COATED ORAL EVERY 12 HOURS
Qty: 0 | Refills: 0 | Status: DISCONTINUED | OUTPATIENT
Start: 2019-03-27 | End: 2019-03-29

## 2019-03-27 RX ORDER — AMIODARONE HYDROCHLORIDE 400 MG/1
200 TABLET ORAL DAILY
Qty: 0 | Refills: 0 | Status: DISCONTINUED | OUTPATIENT
Start: 2019-03-27 | End: 2019-03-29

## 2019-03-27 RX ADMIN — Medication 650 MILLIGRAM(S): at 05:30

## 2019-03-27 RX ADMIN — Medication 650 MILLIGRAM(S): at 06:55

## 2019-03-27 RX ADMIN — Medication 120 MILLIGRAM(S): at 05:27

## 2019-03-27 RX ADMIN — LOSARTAN POTASSIUM 100 MILLIGRAM(S): 100 TABLET, FILM COATED ORAL at 05:26

## 2019-03-27 RX ADMIN — Medication 81 MILLIGRAM(S): at 12:01

## 2019-03-27 RX ADMIN — GABAPENTIN 100 MILLIGRAM(S): 400 CAPSULE ORAL at 05:26

## 2019-03-27 RX ADMIN — SODIUM CHLORIDE 100 MILLILITER(S): 9 INJECTION INTRAMUSCULAR; INTRAVENOUS; SUBCUTANEOUS at 17:54

## 2019-03-27 RX ADMIN — APIXABAN 5 MILLIGRAM(S): 2.5 TABLET, FILM COATED ORAL at 17:26

## 2019-03-27 RX ADMIN — AMIODARONE HYDROCHLORIDE 200 MILLIGRAM(S): 400 TABLET ORAL at 05:27

## 2019-03-27 RX ADMIN — Medication 100 MILLIGRAM(S): at 21:31

## 2019-03-27 RX ADMIN — SODIUM CHLORIDE 70 MILLILITER(S): 9 INJECTION INTRAMUSCULAR; INTRAVENOUS; SUBCUTANEOUS at 23:13

## 2019-03-27 RX ADMIN — SENNA PLUS 2 TABLET(S): 8.6 TABLET ORAL at 21:31

## 2019-03-27 RX ADMIN — GABAPENTIN 100 MILLIGRAM(S): 400 CAPSULE ORAL at 17:26

## 2019-03-27 NOTE — PHYSICAL THERAPY INITIAL EVALUATION ADULT - GENERAL OBSERVATIONS, REHAB EVAL
[id number] am Chart reviewed. Pt. seen semirecline in bed , in No apparent distress, somnolent, +tele ,IV meds ongoing, daughter at bedside. Pt. is Greek speaking, deaf and mute.

## 2019-03-27 NOTE — PROGRESS NOTE ADULT - SUBJECTIVE AND OBJECTIVE BOX
SUBJECTIVE:    Patient is a 78y old Male who presents with a chief complaint of unwitnessed fall and chest pain (26 Mar 2019 10:05)    Currently admitted to medicine with the primary diagnosis of Chest pain     Today is hospital day 4d. This morning he is resting comfortably in bed and reports no new issues or overnight events.     PAST MEDICAL & SURGICAL HISTORY  Neuropathy  HTN (hypertension)  Diabetes  No significant past surgical history    SOCIAL HISTORY:  Negative for smoking/alcohol/drug use.     ALLERGIES:  No Known Allergies    MEDICATIONS:  STANDING MEDICATIONS  amiodarone    Tablet 200 milliGRAM(s) Oral daily  apixaban 5 milliGRAM(s) Oral every 12 hours  aspirin  chewable 81 milliGRAM(s) Oral daily  dextrose 50% Injectable 12.5 Gram(s) IV Push once  dextrose 50% Injectable 25 Gram(s) IV Push once  dextrose 50% Injectable 25 Gram(s) IV Push once  diltiazem    milliGRAM(s) Oral daily  gabapentin 100 milliGRAM(s) Oral two times a day  insulin glargine Injectable (LANTUS) 12 Unit(s) SubCutaneous at bedtime  insulin lispro (HumaLOG) corrective regimen sliding scale   SubCutaneous three times a day before meals  insulin lispro Injectable (HumaLOG) 3 Unit(s) SubCutaneous before breakfast  insulin lispro Injectable (HumaLOG) 3 Unit(s) SubCutaneous before lunch  insulin lispro Injectable (HumaLOG) 3 Unit(s) SubCutaneous before dinner  sodium chloride 0.9%. 1000 milliLiter(s) IV Continuous <Continuous>  sodium chloride 0.9%. 1000 milliLiter(s) IV Continuous <Continuous>    PRN MEDICATIONS  acetaminophen   Tablet .. 650 milliGRAM(s) Oral every 6 hours PRN  dextrose 40% Gel 15 Gram(s) Oral once PRN  glucagon  Injectable 1 milliGRAM(s) IntraMuscular once PRN    VITALS:   T(F): 103.2  HR: 106  BP: 129/68  RR: 20  SpO2: --    PHYSICAL EXAM:  GEN: No acute distress  LUNGS: decreased air entry b/l   HEART: S1/S2 present.    ABD: Soft, non-tender, non-distended.    NEURO: hard to assess pt deaf and mute            LABS:                        11.9   9.96  )-----------( 182      ( 26 Mar 2019 10:46 )             35.1     03-26    138  |  100  |  47<H>  ----------------------------<  175<H>  3.7   |  23  |  1.8<H>    Ca    8.7      26 Mar 2019 10:46      PTT - ( 27 Mar 2019 00:47 )  PTT:76.4 sec            Culture - Blood (collected 24 Mar 2019 11:22)  Source: .Blood Blood-Peripheral  Preliminary Report (25 Mar 2019 17:01):    No growth to date.            RADIOLOGY: SUBJECTIVE:    Patient is a 78y old Male who presents with a chief complaint of unwitnessed fall and chest pain (26 Mar 2019 10:05)    Currently admitted to medicine with the primary diagnosis of Chest pain     Today is hospital day 4d. Pt spiked a fever of 103 overnight.    PAST MEDICAL & SURGICAL HISTORY  Neuropathy  HTN (hypertension)  Diabetes  No significant past surgical history    SOCIAL HISTORY:  Negative for smoking/alcohol/drug use.     ALLERGIES:  No Known Allergies    MEDICATIONS:  STANDING MEDICATIONS  amiodarone    Tablet 200 milliGRAM(s) Oral daily  apixaban 5 milliGRAM(s) Oral every 12 hours  aspirin  chewable 81 milliGRAM(s) Oral daily  dextrose 50% Injectable 12.5 Gram(s) IV Push once  dextrose 50% Injectable 25 Gram(s) IV Push once  dextrose 50% Injectable 25 Gram(s) IV Push once  diltiazem    milliGRAM(s) Oral daily  gabapentin 100 milliGRAM(s) Oral two times a day  insulin glargine Injectable (LANTUS) 12 Unit(s) SubCutaneous at bedtime  insulin lispro (HumaLOG) corrective regimen sliding scale   SubCutaneous three times a day before meals  insulin lispro Injectable (HumaLOG) 3 Unit(s) SubCutaneous before breakfast  insulin lispro Injectable (HumaLOG) 3 Unit(s) SubCutaneous before lunch  insulin lispro Injectable (HumaLOG) 3 Unit(s) SubCutaneous before dinner  sodium chloride 0.9%. 1000 milliLiter(s) IV Continuous <Continuous>  sodium chloride 0.9%. 1000 milliLiter(s) IV Continuous <Continuous>    PRN MEDICATIONS  acetaminophen   Tablet .. 650 milliGRAM(s) Oral every 6 hours PRN  dextrose 40% Gel 15 Gram(s) Oral once PRN  glucagon  Injectable 1 milliGRAM(s) IntraMuscular once PRN    VITALS:   T(F): 103.2  HR: 106  BP: 129/68  RR: 20  SpO2: --    PHYSICAL EXAM:  GEN: No acute distress  LUNGS: decreased air entry b/l   HEART: S1/S2 present.    ABD: Soft, non-tender, non-distended.    NEURO: hard to assess pt deaf and mute            LABS:                        11.9   9.96  )-----------( 182      ( 26 Mar 2019 10:46 )             35.1     03-26    138  |  100  |  47<H>  ----------------------------<  175<H>  3.7   |  23  |  1.8<H>    Ca    8.7      26 Mar 2019 10:46      PTT - ( 27 Mar 2019 00:47 )  PTT:76.4 sec            Culture - Blood (collected 24 Mar 2019 11:22)  Source: .Blood Blood-Peripheral  Preliminary Report (25 Mar 2019 17:01):    No growth to date.            RADIOLOGY:

## 2019-03-27 NOTE — PROGRESS NOTE ADULT - ASSESSMENT
74 y m who is Slovenian deaf and mute, htn, dm, diabetic neuropathy, high psa but no workup was done presented with unwitnessed fall      #) rule out sepsis  -pt again spiked fever overnight to 103 .   - b/c negative on 3/24 , UA not convincing, hematuria on UA before  - will re panculture him  - de escelate antibiotcs yesterday since no hemodynamic compromise , no white count , could be secondary to malignancy , will order esr , crp , tumor markers? Pt will eventually need CT abd with contrast but with decreasing kidney function its not possible for now.  - chest xray ok  - Monitor for signs of sepsis  - GOC conversation initiated with daughter      #) New onset afib , pt back in sinus  -  mg OD , decrease Amiodarone to 200mg po q24   - d/c heparin and start Eliquis 5mg po q12  - tele monitoring  for now but will d/c tele in am if still in SR    #) ALTA on CKD  - IV hydration , avoid nephrotoxins  - d/c ace / arbs , hctz    # Unwitnessed fall - likely due to progressive deconditioning 2/2 pt not ambulating for months  rehab and assess for inpt adm    # Transaminitis  recheck     # HTN  - on Cardizime , will hold ace / arb , hctz    # DM2   - monitor fingerstick   - start insulin if FS>200    # Hematuria   - hx of high psa   - enlarged prostate on scan    # DVT PPX  - c/w enoxaparin 74 y m who is Guine[place] deaf and mute, htn, dm, diabetic neuropathy, high psa but no workup was done presented with unwitnessed fall      #) rule out sepsis  -pt again spiked fever overnight to 103 .   - b/c negative on 3/24 , UA not convincing, hematuria on UA before  - will re panculture him  - de escelate antibiotcs yesterday since no hemodynamic compromise , no white count , could be secondary to malignancy , will order esr , crp , tumor markers? Pt will eventually need CT abd with contrast but with decreasing kidney function its not possible for now.  - chest xray ok  - Monitor for signs of sepsis  - GOC conversation initiated with daughter      #) New onset afib , pt back in sinus  -  mg OD , decrease Amiodarone to 200mg po q24   - d/c heparin and start Eliquis 5mg po q12  - tele monitoring  for now     #) ALTA on CKD  - IV hydration , avoid nephrotoxins  - d/c ace / arbs , hctz    # Unwitnessed fall - likely due to progressive deconditioning 2/2 pt not ambulating for months  rehab and PT on board    # Transaminitis - monitor daily    # HTN  - on Cardizem , will hold ace / arb , hctz    # DM2   - monitor fingerstick   - start insulin if FS>200    # Hematuria   - hx of high psa   - enlarged prostate on scan    # DVT PPX  - c/w enoxaparin     #) Diet - Dash Diet    #) Full code     #) Dispo : pending fever resolution , Pt/ Rehab clearance

## 2019-03-27 NOTE — CHART NOTE - NSCHARTNOTEFT_GEN_A_CORE
pt's daughter refusing any further blood draws including blood culture , ESR, CR p , tumor markers. WIll repost it for next round.

## 2019-03-27 NOTE — PROGRESS NOTE ADULT - SUBJECTIVE AND OBJECTIVE BOX
Name: INES ELKINS  Age: 78y  Gender: Male    Pt was seen and examined.   denies any pains, feels weak he says    Allergies:  No Known Allergies      PHYSICAL EXAM:    Vitals:  T(C): 37.6 (03-27-19 @ 20:56), Max: 39.6 (03-27-19 @ 05:30)  HR: 80 (03-27-19 @ 20:56) (80 - 106)  BP: 147/67 (03-27-19 @ 20:56) (121/59 - 147/67)  RR: 18 (03-27-19 @ 20:56) (18 - 20)  SpO2: --  Wt(kg): --Vital Signs Last 24 Hrs  T(C): 37.6 (27 Mar 2019 20:56), Max: 39.6 (27 Mar 2019 05:30)  T(F): 99.7 (27 Mar 2019 20:56), Max: 103.2 (27 Mar 2019 05:30)  HR: 80 (27 Mar 2019 20:56) (80 - 106)  BP: 147/67 (27 Mar 2019 20:56) (121/59 - 147/67)  BP(mean): --  RR: 18 (27 Mar 2019 20:56) (18 - 20)  SpO2: --      NECK: Supple, No JVD  CHEST/LUNG: CTA, B/L, No rales, rhonchi, wheezing  HEART: S1,S2, N1 Regular rate and rhythm; No murmurs, rubs, or gallops  ABDOMEN: Soft, Nontender, Nondistended; Bowel sounds present  EXTREMITIES:  2+ Peripheral Pulses, No clubbing, cyanosis, or edema      LABS:                        10.5   5.57  )-----------( 173      ( 27 Mar 2019 10:08 )             30.2     03-27    137  |  101  |  49<H>  ----------------------------<  203<H>  4.1   |  23  |  1.7<H>    Ca    8.2<L>      27 Mar 2019 10:08              MEDICATIONS  (STANDING):  amiodarone    Tablet 200 milliGRAM(s) Oral daily  apixaban 5 milliGRAM(s) Oral every 12 hours  aspirin  chewable 81 milliGRAM(s) Oral daily  dextrose 50% Injectable 12.5 Gram(s) IV Push once  dextrose 50% Injectable 25 Gram(s) IV Push once  dextrose 50% Injectable 25 Gram(s) IV Push once  diltiazem    milliGRAM(s) Oral daily  docusate sodium 100 milliGRAM(s) Oral three times a day  gabapentin 100 milliGRAM(s) Oral two times a day  insulin glargine Injectable (LANTUS) 12 Unit(s) SubCutaneous at bedtime  insulin lispro (HumaLOG) corrective regimen sliding scale   SubCutaneous three times a day before meals  insulin lispro Injectable (HumaLOG) 3 Unit(s) SubCutaneous before breakfast  insulin lispro Injectable (HumaLOG) 3 Unit(s) SubCutaneous before lunch  insulin lispro Injectable (HumaLOG) 3 Unit(s) SubCutaneous before dinner  senna 2 Tablet(s) Oral at bedtime  sodium chloride 0.9%. 1000 milliLiter(s) (70 mL/Hr) IV Continuous <Continuous>  sodium chloride 0.9%. 1000 milliLiter(s) (100 mL/Hr) IV Continuous <Continuous>        RADIOLOGY & ADDITIONAL TESTS:    Imaging Personally Reviewed:  [ ] YES  [ ] NO    A/P:  Assessment and Plan:   · Assessment		  74 y m who is Malaysian deaf and mute, htn, dm, diabetic neuropathy, high psa but no workup was done presented with unwitnessed fall      #) rule out sepsis  - b/c negative on 3/24 , UA not convincing, hematuria on UA  - Tmax 103 this am, still no focus  - chest xray nothing acute  - Monitor for signs of sepsis      #) New onset afib , pt back in sinus currently  -  mg OD , decrease Amiodarone to 200mg po q24 in am  -  Eliquis 5mg po q12  - off tele    # Unwitnessed fall - likely due to progressive deconditioning 2/2 pt not ambulating for months  rehab and assess for inpt adm    # HTN  - c/w losartan    # DM2   - monitor fingerstick   - start insulin if FS>200    # Hematuria   - hx of high psa   - enlarged prostate on scan    d/w pts dtr at length, she refuses further blood draws for now, will think, likely ok to draw in am

## 2019-03-28 LAB
FLUAV H1 2009 PAND RNA SPEC QL NAA+PROBE: DETECTED
GLUCOSE BLDC GLUCOMTR-MCNC: 134 MG/DL — HIGH (ref 70–99)
GLUCOSE BLDC GLUCOMTR-MCNC: 135 MG/DL — HIGH (ref 70–99)
GLUCOSE BLDC GLUCOMTR-MCNC: 138 MG/DL — HIGH (ref 70–99)
GLUCOSE BLDC GLUCOMTR-MCNC: 183 MG/DL — HIGH (ref 70–99)
RAPID RVP RESULT: DETECTED

## 2019-03-28 RX ADMIN — GABAPENTIN 100 MILLIGRAM(S): 400 CAPSULE ORAL at 05:56

## 2019-03-28 RX ADMIN — AMIODARONE HYDROCHLORIDE 200 MILLIGRAM(S): 400 TABLET ORAL at 05:56

## 2019-03-28 RX ADMIN — Medication 81 MILLIGRAM(S): at 13:02

## 2019-03-28 RX ADMIN — APIXABAN 5 MILLIGRAM(S): 2.5 TABLET, FILM COATED ORAL at 05:56

## 2019-03-28 RX ADMIN — GABAPENTIN 100 MILLIGRAM(S): 400 CAPSULE ORAL at 17:14

## 2019-03-28 RX ADMIN — Medication 30 MILLIGRAM(S): at 17:13

## 2019-03-28 RX ADMIN — Medication 100 MILLIGRAM(S): at 20:54

## 2019-03-28 RX ADMIN — APIXABAN 5 MILLIGRAM(S): 2.5 TABLET, FILM COATED ORAL at 17:14

## 2019-03-28 RX ADMIN — Medication 120 MILLIGRAM(S): at 05:56

## 2019-03-28 RX ADMIN — SENNA PLUS 2 TABLET(S): 8.6 TABLET ORAL at 20:55

## 2019-03-28 RX ADMIN — Medication 100 MILLIGRAM(S): at 05:56

## 2019-03-28 RX ADMIN — Medication 30 MILLIGRAM(S): at 10:29

## 2019-03-28 NOTE — DIETITIAN INITIAL EVALUATION ADULT. - ENERGY NEEDS
Calories: 9608-2642 kcal/day (MSJ x 1.2-1.3 AF)  Protein: 67-81 gm/day (0.9-1.1 gm/kg CBW)  Fluid: 1 ml/kcal

## 2019-03-28 NOTE — PROGRESS NOTE ADULT - ASSESSMENT
74 y m who is Iranian deaf and mute, htn, dm, diabetic neuropathy, high psa but no workup was done presented with unwitnessed fall      #) rule out sepsis  - b/c negative on 3/24 , UA not convincing, hematuria on UA before  - wanted to panculture , family refused   - de escelate antibiotcs yesterday since no hemodynamic compromise , no white count  - chest xray ok  - Monitor for signs of sepsis  - GOC conversation initiated with daughter    #) Influenza / RVP positive  - airborne / contact precautions  - tamiflu 30 mg x 5 days     #) New onset afib , pt back in sinus  -  mg OD , decrease Amiodarone to 200mg po q24   -  Eliquis 5mg po q12  - tele monitoring  for now     #) ALTA on CKD  - IV hydration , avoid nephrotoxins . family refusing further blood draws.  - d/c ace / arbs , hctz     # Unwitnessed fall - likely due to progressive deconditioning 2/2 pt not ambulating for months  rehab and PT on board    # Transaminitis - monitor daily    # HTN  - on Cardizem , will hold ace / arb , hctz    # DM2   - monitor fingerstick   - start insulin if FS>200    # Hematuria   - hx of high psa   - enlarged prostate on scan    # DVT PPX  - c/w enoxaparin     #) Diet - Dash Diet    #) Full code     #) Dispo : pending fever resolution , Pt/ Rehab clearance 74 y m who is Ugand[place] deaf and mute, htn, dm, diabetic neuropathy, high psa but no workup was done presented with unwitnessed fall      #) sepsis - 2/2 influenza    #) Influenza / RVP positive  - airborne / contact precautions  - tamiflu 30 mg x 5 days     #) New onset afib , pt back in sinus  -  mg OD , decrease Amiodarone to 200mg po q24   -  Eliquis 5mg po q12    #) ALTA on CKD  - IV hydration , avoid nephrotoxins . family refusing further blood draws.  - d/c ace / arbs , hctz         # HTN  - on Cardizem , will hold ace / arb , hctz    # DM2   - monitor fingerstick   - start insulin if FS>200    # Hematuria   - hx of high psa   - enlarged prostate on scan    # DVT PPX  - c/w enoxaparin     #) Diet - Dash Diet    #) Full code     #) Dispo : pending fever resolution , Pt/ Rehab clearance     long d/w pts dtr re possible AMA but finally decided not to.  i will try and convince her in am for blood draw to assess renal function  for d/c in 48 hrs or so

## 2019-03-28 NOTE — DIETITIAN INITIAL EVALUATION ADULT. - PHYSICAL APPEARANCE
well nourished/BMI: 27.1, IBW: 136#, UBW: ~167#, denies any recent wt loss. no edema noted, stage I pressure injury to sacrum

## 2019-03-28 NOTE — DIETITIAN INITIAL EVALUATION ADULT. - OTHER INFO
Pt p/w unwitnessed fall ikely due to progressive deconditioning 2/2 pt not ambulating for months--rehab and PT on board. Deescalate antibiotics yesterday since no hemodynamic compromise, no white count. GOC conversation initiated with daughter. Dispo : pending fever resolution, Pt/ Rehab clearance. Reason for Assessment: poor po x5 days

## 2019-03-28 NOTE — PROGRESS NOTE ADULT - SUBJECTIVE AND OBJECTIVE BOX
SUBJECTIVE:    Patient is a 78y old Male who presents with a chief complaint of unwitnessed fall and chest pain (27 Mar 2019 23:31)    Currently admitted to medicine with the primary diagnosis of Chest pain     Today is hospital day 5d. influenza and RVP positive.     PAST MEDICAL & SURGICAL HISTORY  Neuropathy  HTN (hypertension)  Diabetes  No significant past surgical history    SOCIAL HISTORY:  Negative for smoking/alcohol/drug use.     ALLERGIES:  No Known Allergies    MEDICATIONS:  STANDING MEDICATIONS  amiodarone    Tablet 200 milliGRAM(s) Oral daily  apixaban 5 milliGRAM(s) Oral every 12 hours  aspirin  chewable 81 milliGRAM(s) Oral daily  dextrose 50% Injectable 12.5 Gram(s) IV Push once  dextrose 50% Injectable 25 Gram(s) IV Push once  dextrose 50% Injectable 25 Gram(s) IV Push once  diltiazem    milliGRAM(s) Oral daily  docusate sodium 100 milliGRAM(s) Oral three times a day  gabapentin 100 milliGRAM(s) Oral two times a day  insulin glargine Injectable (LANTUS) 12 Unit(s) SubCutaneous at bedtime  insulin lispro (HumaLOG) corrective regimen sliding scale   SubCutaneous three times a day before meals  insulin lispro Injectable (HumaLOG) 3 Unit(s) SubCutaneous before breakfast  insulin lispro Injectable (HumaLOG) 3 Unit(s) SubCutaneous before lunch  insulin lispro Injectable (HumaLOG) 3 Unit(s) SubCutaneous before dinner  oseltamivir 30 milliGRAM(s) Oral two times a day  senna 2 Tablet(s) Oral at bedtime  sodium chloride 0.9%. 1000 milliLiter(s) IV Continuous <Continuous>  sodium chloride 0.9%. 1000 milliLiter(s) IV Continuous <Continuous>    PRN MEDICATIONS  acetaminophen   Tablet .. 650 milliGRAM(s) Oral every 6 hours PRN  dextrose 40% Gel 15 Gram(s) Oral once PRN  glucagon  Injectable 1 milliGRAM(s) IntraMuscular once PRN    VITALS:   T(F): 98.7  HR: 80  BP: 148/71  RR: 18  SpO2: --    PHYSICAL EXAM:  GEN: No acute distress  LUNGS: decreased air entry b/l   HEART: S1/S2 present.    ABD: Soft, non-tender, non-distended.    NEURO: hard to assess pt deaf and mute    LABS:                        10.5   5.57  )-----------( 173      ( 27 Mar 2019 10:08 )             30.2     03-27    137  |  101  |  49<H>  ----------------------------<  203<H>  4.1   |  23  |  1.7<H>    Ca    8.2<L>      27 Mar 2019 10:08      PTT - ( 27 Mar 2019 10:08 )  PTT:36.0 sec            Culture - Blood (collected 26 Mar 2019 10:46)  Source: .Blood None  Preliminary Report (27 Mar 2019 19:04):    No growth to date.            RADIOLOGY:

## 2019-03-28 NOTE — DIETITIAN INITIAL EVALUATION ADULT. - FEEDING SKILL
Per daughter pt isn't eating anything except maybe fruit, but is drinking Glucerna daily. Pt prefers vanilla flavor and that's all he wants. Will communicate pt's flavor preferences to ./independent

## 2019-03-29 ENCOUNTER — TRANSCRIPTION ENCOUNTER (OUTPATIENT)
Age: 79
End: 2019-03-29

## 2019-03-29 VITALS — DIASTOLIC BLOOD PRESSURE: 51 MMHG | RESPIRATION RATE: 18 BRPM | SYSTOLIC BLOOD PRESSURE: 112 MMHG | HEART RATE: 80 BPM

## 2019-03-29 LAB
CULTURE RESULTS: SIGNIFICANT CHANGE UP
GLUCOSE BLDC GLUCOMTR-MCNC: 193 MG/DL — HIGH (ref 70–99)
GLUCOSE BLDC GLUCOMTR-MCNC: 216 MG/DL — HIGH (ref 70–99)
SPECIMEN SOURCE: SIGNIFICANT CHANGE UP

## 2019-03-29 RX ORDER — METFORMIN HYDROCHLORIDE 850 MG/1
1 TABLET ORAL
Qty: 0 | Refills: 0 | COMMUNITY

## 2019-03-29 RX ORDER — DILTIAZEM HCL 120 MG
1 CAPSULE, EXT RELEASE 24 HR ORAL
Qty: 30 | Refills: 0
Start: 2019-03-29 | End: 2019-04-27

## 2019-03-29 RX ORDER — AMIODARONE HYDROCHLORIDE 400 MG/1
1 TABLET ORAL
Qty: 30 | Refills: 0
Start: 2019-03-29 | End: 2019-04-27

## 2019-03-29 RX ORDER — ASPIRIN/CALCIUM CARB/MAGNESIUM 324 MG
1 TABLET ORAL
Qty: 0 | Refills: 0 | DISCHARGE
Start: 2019-03-29

## 2019-03-29 RX ORDER — CLOPIDOGREL BISULFATE 75 MG/1
1 TABLET, FILM COATED ORAL
Qty: 30 | Refills: 0
Start: 2019-03-29 | End: 2019-04-27

## 2019-03-29 RX ADMIN — Medication 100 MILLIGRAM(S): at 05:53

## 2019-03-29 RX ADMIN — AMIODARONE HYDROCHLORIDE 200 MILLIGRAM(S): 400 TABLET ORAL at 05:53

## 2019-03-29 RX ADMIN — Medication 120 MILLIGRAM(S): at 05:53

## 2019-03-29 RX ADMIN — Medication 30 MILLIGRAM(S): at 05:53

## 2019-03-29 RX ADMIN — GABAPENTIN 100 MILLIGRAM(S): 400 CAPSULE ORAL at 05:53

## 2019-03-29 RX ADMIN — APIXABAN 5 MILLIGRAM(S): 2.5 TABLET, FILM COATED ORAL at 05:53

## 2019-03-29 RX ADMIN — Medication 81 MILLIGRAM(S): at 12:29

## 2019-03-29 NOTE — PROGRESS NOTE ADULT - SUBJECTIVE AND OBJECTIVE BOX
SUBJECTIVE:    Patient is a 78y old Male who presents with a chief complaint of unwitnessed fall and chest pain (28 Mar 2019 10:48)    Currently admitted to medicine with the primary diagnosis of Chest pain     Today is hospital day 6d. Pt not conversive and apparently no complying with blood draws.    PAST MEDICAL & SURGICAL HISTORY  Neuropathy  HTN (hypertension)  Diabetes  No significant past surgical history    SOCIAL HISTORY:  Negative for smoking/alcohol/drug use.     ALLERGIES:  No Known Allergies    MEDICATIONS:  STANDING MEDICATIONS  amiodarone    Tablet 200 milliGRAM(s) Oral daily  apixaban 5 milliGRAM(s) Oral every 12 hours  aspirin  chewable 81 milliGRAM(s) Oral daily  dextrose 50% Injectable 12.5 Gram(s) IV Push once  dextrose 50% Injectable 25 Gram(s) IV Push once  dextrose 50% Injectable 25 Gram(s) IV Push once  diltiazem    milliGRAM(s) Oral daily  docusate sodium 100 milliGRAM(s) Oral three times a day  gabapentin 100 milliGRAM(s) Oral two times a day  insulin glargine Injectable (LANTUS) 12 Unit(s) SubCutaneous at bedtime  insulin lispro (HumaLOG) corrective regimen sliding scale   SubCutaneous three times a day before meals  insulin lispro Injectable (HumaLOG) 3 Unit(s) SubCutaneous before breakfast  insulin lispro Injectable (HumaLOG) 3 Unit(s) SubCutaneous before lunch  insulin lispro Injectable (HumaLOG) 3 Unit(s) SubCutaneous before dinner  oseltamivir Suspension 30 milliGRAM(s) Oral two times a day  senna 2 Tablet(s) Oral at bedtime  sodium chloride 0.9%. 1000 milliLiter(s) IV Continuous <Continuous>  sodium chloride 0.9%. 1000 milliLiter(s) IV Continuous <Continuous>    PRN MEDICATIONS  acetaminophen   Tablet .. 650 milliGRAM(s) Oral every 6 hours PRN  dextrose 40% Gel 15 Gram(s) Oral once PRN  glucagon  Injectable 1 milliGRAM(s) IntraMuscular once PRN    VITALS:   T(F): 98.1  HR: 69  BP: 168/77  RR: 20  SpO2: --    PHYSICAL EXAM:   GEN: No acute distress  LUNGS: decreased air entry b/l   HEART: S1/S2 present.    ABD: Soft, non-tender, non-distended.    NEURO: hard to assess pt deaf and mute      LABS:                        10.5   5.57  )-----------( 173      ( 27 Mar 2019 10:08 )             30.2     03-27    137  |  101  |  49<H>  ----------------------------<  203<H>  4.1   |  23  |  1.7<H>    Ca    8.2<L>      27 Mar 2019 10:08      PTT - ( 27 Mar 2019 10:08 )  PTT:36.0 sec            Culture - Blood (collected 27 Mar 2019 10:08)  Source: .Blood None  Preliminary Report (28 Mar 2019 19:01):    No growth to date.    Culture - Blood (collected 26 Mar 2019 10:46)  Source: .Blood None  Preliminary Report (27 Mar 2019 19:04):    No growth to date.            RADIOLOGY:

## 2019-03-29 NOTE — PROGRESS NOTE ADULT - ASSESSMENT
74 y m who is British deaf and mute, htn, dm, diabetic neuropathy, high psa but no workup was done presented with unwitnessed fall      #) sepsis - 2/2 influenza    #) Influenza / RVP positive  - airborne / contact precautions  - tamiflu 30 mg x 5 days     #) New onset afib , pt back in sinus  -  mg OD , decrease Amiodarone to 200mg po q24   -  Eliquis 5mg po q12    #) ALTA on CKD  - IV hydration , avoid nephrotoxins . family refusing further blood draws.  - d/c ace / arbs , hctz         # HTN  - on Cardizem , will hold ace / arb , hctz    # DM2   - monitor fingerstick   - start insulin if FS>200    # Hematuria   - hx of high psa   - enlarged prostate on scan    # DVT PPX  - c/w enoxaparin     #) Diet - Dash Diet    #) Full code     #) Dispo : pending fever resolution , Pt/ Rehab clearance     pt does not want blood draws nor does daughter , possible ama vs [facility] next week?

## 2019-03-29 NOTE — DISCHARGE NOTE NURSING/CASE MANAGEMENT/SOCIAL WORK - NSDCDPATPORTLINK_GEN_ALL_CORE
You can access the [facility], offered by [facility], by registering with the following website: [email or web address]

## 2019-03-29 NOTE — PROGRESS NOTE ADULT - REASON FOR ADMISSION
unwitnessed fall and chest pain

## 2019-03-29 NOTE — DISCHARGE NOTE PROVIDER - HOSPITAL COURSE
74 y m who is Taiwanese deaf and mute, htn, dm, diabetic neuropathy, high psa but no workup was done presented with unwitnessed fall            #) sepsis - 2/2 influenza        #) Influenza / RVP positive    - airborne / contact precautions    - tamiflu 30 mg x 5 days         #) New onset afib , pt back in sinus    -  mg OD , decrease Amiodarone to 200mg po q24     -  Eliquis 5mg po q12        #) ALTA on CKD    - IV hydration , avoid nephrotoxins . family refusing further blood draws.    - d/c ace / arbs , hctz                 # HTN    - on Cardizem , will hold ace / arb , hctz        # DM2     - monitor fingerstick     - start insulin if FS>200        # Hematuria     - hx of high psa     - enlarged prostate on scan        # DVT PPX    - c/w enoxaparin         #) Diet - Dash Diet        #) Full code         #) Dispo : pending fever resolution , Pt/ Rehab clearance         pt does not want blood draws nor does daughter , possible ama vs [facility] next week? 74 y m who is [place] deaf and mute, htn, dm, diabetic neuropathy, high psa but no workup was done presented with unwitnessed fall             #) sepsis on admission secondary to influenza            #) Influenza / RVP positive    - airborne / contact precautions    - tamiflu 30 mg x 5 days         #) New onset afib , pt back in sinus    -  mg OD , decrease Amiodarone to 200mg po q24     -  Eliquis 5mg po q12        #) ALTA on CKD    - IV hydration , avoid nephrotoxins . family refusing further blood draws.    - d/c ace / arbs , hctz                 # HTN    - on Cardizem , will hold ace / arb , hctz        # DM2     - monitor fingerstick     - start insulin if FS>200        # Hematuria     - hx of high psa     - enlarged prostate on scan        # DVT PPX    - c/w enoxaparin         #) Diet - Dash Diet        #) Full code         #) Dispo : pending fever resolution , Pt/ Rehab clearance         pt does not want blood draws nor does daughter , possible ama vs [facility] next week?

## 2019-03-29 NOTE — DISCHARGE NOTE PROVIDER - NSDCCPCAREPLAN_GEN_ALL_CORE_FT
PRINCIPAL DISCHARGE DIAGNOSIS  Diagnosis: Influenza A  Assessment and Plan of Treatment: treatment and diagnosis.      SECONDARY DISCHARGE DIAGNOSES  Diagnosis: Transaminitis  Assessment and Plan of Treatment: improvement    Diagnosis: Generalized weakness  Assessment and Plan of Treatment: diagnosis and treatment

## 2019-03-29 NOTE — DISCHARGE NOTE PROVIDER - CARE PROVIDER_API CALL
Stiven Mendoza (MD)  Medicine  [address] [place] [address]  Phone: [phone]  Fax: [phone]  Follow Up Time:

## 2019-03-31 LAB
CULTURE RESULTS: SIGNIFICANT CHANGE UP
SPECIMEN SOURCE: SIGNIFICANT CHANGE UP

## 2019-04-01 LAB
CULTURE RESULTS: SIGNIFICANT CHANGE UP
SPECIMEN SOURCE: SIGNIFICANT CHANGE UP

## 2019-04-03 ENCOUNTER — APPOINTMENT (OUTPATIENT)
Dept: UROLOGY | Facility: CLINIC | Age: 79
End: 2019-04-03
Payer: MEDICARE

## 2019-04-03 VITALS
HEART RATE: 79 BPM | SYSTOLIC BLOOD PRESSURE: 101 MMHG | HEIGHT: 61 IN | BODY MASS INDEX: 30.21 KG/M2 | WEIGHT: 160 LBS | DIASTOLIC BLOOD PRESSURE: 66 MMHG

## 2019-04-03 DIAGNOSIS — N13.8 BENIGN PROSTATIC HYPERPLASIA WITH LOWER URINARY TRACT SYMPMS: ICD-10-CM

## 2019-04-03 DIAGNOSIS — N40.1 BENIGN PROSTATIC HYPERPLASIA WITH LOWER URINARY TRACT SYMPMS: ICD-10-CM

## 2019-04-03 DIAGNOSIS — Z86.39 PERSONAL HISTORY OF OTHER ENDOCRINE, NUTRITIONAL AND METABOLIC DISEASE: ICD-10-CM

## 2019-04-03 DIAGNOSIS — Z86.79 PERSONAL HISTORY OF OTHER DISEASES OF THE CIRCULATORY SYSTEM: ICD-10-CM

## 2019-04-03 PROCEDURE — 99203 OFFICE O/P NEW LOW 30 MIN: CPT

## 2019-04-03 NOTE — ASSESSMENT
[FreeTextEntry1] : BPH with lower urinary tract symptoms and reportedly high PSA. We'll empirically give Cipro and recheck PSA and also Flomax for lower urinary tract symptoms

## 2019-04-03 NOTE — PHYSICAL EXAM
[General Appearance - In No Acute Distress] : no acute distress [] : no respiratory distress [Prostate Tenderness] : the prostate was not tender [No Prostate Nodules] : no prostate nodules [Prostate Size ___ (0-4)] : prostate size [unfilled] (scale: 0-4) [FreeTextEntry1] : Uses a cane [Oriented To Time, Place, And Person] : oriented to person, place, and time

## 2019-04-03 NOTE — HISTORY OF PRESENT ILLNESS
[FreeTextEntry1] : 78-year-old sent here because of elevated PSA. 2 family members think the number was 24 but they do not have the results, do not know which lab it was done at and it was ordered by Dr. keith but his office is closed at this time so I cannot review the results. He also complains of poor urinary flow, urinary frequency and urgency. He has a history of urinary retention in 2015 treated with tamsulosin. He voided after catheter removal and his ultrasound then showed a 60 cc prostate.\par Ultrasound now shows a 96 cc prostate transabdominal. There is no right Joanna on the right upper quadrant sonogram. His creatinine is 1.7. He is currently on Plavix for what the family says is circulation problems were only a 30 day course. Note that there is a history of noncompliance both in terms of medication use and followup with physicians according to the family.

## 2019-04-04 ENCOUNTER — MESSAGE (OUTPATIENT)
Age: 79
End: 2019-04-04

## 2019-04-04 PROBLEM — G62.9 POLYNEUROPATHY, UNSPECIFIED: Chronic | Status: ACTIVE | Noted: 2019-03-23

## 2019-04-04 PROBLEM — E11.9 TYPE 2 DIABETES MELLITUS WITHOUT COMPLICATIONS: Chronic | Status: ACTIVE | Noted: 2019-03-23

## 2019-04-04 PROBLEM — I10 ESSENTIAL (PRIMARY) HYPERTENSION: Chronic | Status: ACTIVE | Noted: 2019-03-23

## 2019-04-08 DIAGNOSIS — R31.9 HEMATURIA, UNSPECIFIED: ICD-10-CM

## 2019-04-08 DIAGNOSIS — R74.0 NONSPECIFIC ELEVATION OF LEVELS OF TRANSAMINASE AND LACTIC ACID DEHYDROGENASE [LDH]: ICD-10-CM

## 2019-04-08 DIAGNOSIS — E11.22 TYPE 2 DIABETES MELLITUS WITH DIABETIC CHRONIC KIDNEY DISEASE: ICD-10-CM

## 2019-04-08 DIAGNOSIS — N17.9 ACUTE KIDNEY FAILURE, UNSPECIFIED: ICD-10-CM

## 2019-04-08 DIAGNOSIS — R07.9 CHEST PAIN, UNSPECIFIED: ICD-10-CM

## 2019-04-08 DIAGNOSIS — A41.89 OTHER SPECIFIED SEPSIS: ICD-10-CM

## 2019-04-08 DIAGNOSIS — J10.1 INFLUENZA DUE TO OTHER IDENTIFIED INFLUENZA VIRUS WITH OTHER RESPIRATORY MANIFESTATIONS: ICD-10-CM

## 2019-04-08 DIAGNOSIS — E11.42 TYPE 2 DIABETES MELLITUS WITH DIABETIC POLYNEUROPATHY: ICD-10-CM

## 2019-04-08 DIAGNOSIS — Z74.01 BED CONFINEMENT STATUS: ICD-10-CM

## 2019-04-08 DIAGNOSIS — H91.3 DEAF NONSPEAKING, NOT ELSEWHERE CLASSIFIED: ICD-10-CM

## 2019-04-08 DIAGNOSIS — Z53.20 PROCEDURE AND TREATMENT NOT CARRIED OUT BECAUSE OF PATIENT'S DECISION FOR UNSPECIFIED REASONS: ICD-10-CM

## 2019-04-08 DIAGNOSIS — I12.9 HYPERTENSIVE CHRONIC KIDNEY DISEASE WITH STAGE 1 THROUGH STAGE 4 CHRONIC KIDNEY DISEASE, OR UNSPECIFIED CHRONIC KIDNEY DISEASE: ICD-10-CM

## 2019-04-08 DIAGNOSIS — Z91.81 HISTORY OF FALLING: ICD-10-CM

## 2019-04-08 DIAGNOSIS — I44.7 LEFT BUNDLE-BRANCH BLOCK, UNSPECIFIED: ICD-10-CM

## 2019-04-08 DIAGNOSIS — I48.91 UNSPECIFIED ATRIAL FIBRILLATION: ICD-10-CM

## 2019-04-08 DIAGNOSIS — Z28.21 IMMUNIZATION NOT CARRIED OUT BECAUSE OF PATIENT REFUSAL: ICD-10-CM

## 2019-04-08 DIAGNOSIS — K82.4 CHOLESTEROLOSIS OF GALLBLADDER: ICD-10-CM

## 2019-04-08 DIAGNOSIS — N18.9 CHRONIC KIDNEY DISEASE, UNSPECIFIED: ICD-10-CM

## 2019-04-08 DIAGNOSIS — Z79.84 LONG TERM (CURRENT) USE OF ORAL HYPOGLYCEMIC DRUGS: ICD-10-CM

## 2019-05-08 ENCOUNTER — APPOINTMENT (OUTPATIENT)
Dept: UROLOGY | Facility: CLINIC | Age: 79
End: 2019-05-08
Payer: MEDICARE

## 2019-05-08 VITALS
WEIGHT: 160 LBS | HEIGHT: 61 IN | BODY MASS INDEX: 30.21 KG/M2 | HEART RATE: 79 BPM | SYSTOLIC BLOOD PRESSURE: 101 MMHG | DIASTOLIC BLOOD PRESSURE: 66 MMHG

## 2019-05-08 PROCEDURE — 99213 OFFICE O/P EST LOW 20 MIN: CPT

## 2019-05-08 NOTE — PHYSICAL EXAM
[General Appearance - In No Acute Distress] : no acute distress [] : no respiratory distress [Oriented To Time, Place, And Person] : oriented to person, place, and time

## 2019-05-08 NOTE — REVIEW OF SYSTEMS
[Fever] : no fever [Chest Pain] : no chest pain [Shortness Of Breath] : no shortness of breath [Abdominal Pain] : no abdominal pain [Confused] : no confusion [Dysuria] : no dysuria

## 2019-05-08 NOTE — ASSESSMENT
[FreeTextEntry1] : I discussed at length with patient and his daughter need for compliance and my concern about noncompliance. Currently his internists office is closed and we'll check with them tomorrow to see if a new PSA was done.  If not then he should go to the [facility]  to have it done.  I stressed the importance of compliance and the lack of compliance can lead to undiagnosed cancer and inability to treat in the future

## 2019-05-08 NOTE — HISTORY OF PRESENT ILLNESS
[FreeTextEntry1] : 79-year-old was sent for an elevated PSA and on last visit no results were available to me. His enlarged prostate on ultrasound and was placed on empiric Cipro for presumed PSA of 24 according to the family's recollection.Still unable to obtain results the family does not know which lab had not been able to obtain from his primary. He was placed empirically on Cipro and relates that her urination with better control less frequency. He continues on Flomax also. I have ordered a new PSA post Cipro and the daughter who is with him today (was not with him last visit) does not know if it was done. I have not received any results and she called her sister who does not think it was done.

## 2019-06-04 ENCOUNTER — MESSAGE (OUTPATIENT)
Age: 79
End: 2019-06-04

## 2019-08-21 ENCOUNTER — LABORATORY RESULT (OUTPATIENT)
Age: 79
End: 2019-08-21

## 2019-08-21 ENCOUNTER — APPOINTMENT (OUTPATIENT)
Dept: UROLOGY | Facility: CLINIC | Age: 79
End: 2019-08-21
Payer: MEDICARE

## 2019-08-21 ENCOUNTER — OUTPATIENT (OUTPATIENT)
Dept: OUTPATIENT SERVICES | Facility: HOSPITAL | Age: 79
LOS: 1 days | Discharge: HOME | End: 2019-08-21

## 2019-08-21 PROCEDURE — 55700: CPT

## 2019-08-21 PROCEDURE — 76872 US TRANSRECTAL: CPT

## 2019-08-22 DIAGNOSIS — R97.20 ELEVATED PROSTATE SPECIFIC ANTIGEN [PSA]: ICD-10-CM

## 2019-09-04 ENCOUNTER — APPOINTMENT (OUTPATIENT)
Dept: UROLOGY | Facility: CLINIC | Age: 79
End: 2019-09-04
Payer: MEDICARE

## 2019-09-04 PROCEDURE — 99214 OFFICE O/P EST MOD 30 MIN: CPT

## 2019-09-04 NOTE — ASSESSMENT
[FreeTextEntry1] : Fitzgerald's 7 high volume prostate cancer. Fully discussed with the patient's daughter with patient in the room. Recommend staging bone scan and MRI of the pelvis. Discuss options will depend on staging but they include radiation, hormonal ablation. Return to office after above to further discuss

## 2019-09-04 NOTE — HISTORY OF PRESENT ILLNESS
[FreeTextEntry1] : 79-year-old with PSA 24 recently underwent biopsy of prostate which shows 9/12 cores positive for cancer 5 of them Ashton's 7 (4+3).  He feels well without complaint

## 2019-09-04 NOTE — PHYSICAL EXAM
[General Appearance - In No Acute Distress] : no acute distress [Abdomen Soft] : soft [Abdomen Tenderness] : non-tender [Costovertebral Angle Tenderness] : no ~M costovertebral angle tenderness [] : no respiratory distress [Respiration, Rhythm And Depth] : normal respiratory rhythm and effort [Oriented To Time, Place, And Person] : oriented to person, place, and time [Normal Station and Gait] : the gait and station were normal for the patient's age

## 2019-09-05 ENCOUNTER — OTHER (OUTPATIENT)
Age: 79
End: 2019-09-05

## 2019-09-06 ENCOUNTER — MESSAGE (OUTPATIENT)
Age: 79
End: 2019-09-06

## 2019-09-16 ENCOUNTER — OTHER (OUTPATIENT)
Age: 79
End: 2019-09-16

## 2019-10-04 ENCOUNTER — FORM ENCOUNTER (OUTPATIENT)
Age: 79
End: 2019-10-04

## 2019-10-05 ENCOUNTER — OUTPATIENT (OUTPATIENT)
Dept: OUTPATIENT SERVICES | Facility: HOSPITAL | Age: 79
LOS: 1 days | Discharge: HOME | End: 2019-10-05
Payer: MEDICARE

## 2019-10-05 DIAGNOSIS — C61 MALIGNANT NEOPLASM OF PROSTATE: ICD-10-CM

## 2019-10-05 PROCEDURE — 78320: CPT | Mod: 26

## 2019-10-05 PROCEDURE — 78306 BONE IMAGING WHOLE BODY: CPT | Mod: 26

## 2019-10-15 ENCOUNTER — FORM ENCOUNTER (OUTPATIENT)
Age: 79
End: 2019-10-15

## 2019-10-16 ENCOUNTER — OUTPATIENT (OUTPATIENT)
Dept: OUTPATIENT SERVICES | Facility: HOSPITAL | Age: 79
LOS: 1 days | Discharge: HOME | End: 2019-10-16
Payer: MEDICARE

## 2019-10-16 DIAGNOSIS — C61 MALIGNANT NEOPLASM OF PROSTATE: ICD-10-CM

## 2019-10-16 PROCEDURE — 72197 MRI PELVIS W/O & W/DYE: CPT | Mod: 26

## 2019-10-23 ENCOUNTER — APPOINTMENT (OUTPATIENT)
Dept: UROLOGY | Facility: CLINIC | Age: 79
End: 2019-10-23
Payer: MEDICARE

## 2019-10-23 PROCEDURE — 99214 OFFICE O/P EST MOD 30 MIN: CPT

## 2019-10-23 NOTE — REVIEW OF SYSTEMS
[Abdominal Pain] : abdominal pain [Chest Pain] : no chest pain [Fever] : no fever [Dysuria] : no dysuria [Shortness Of Breath] : no shortness of breath [Confused] : no confusion

## 2019-10-23 NOTE — HISTORY OF PRESENT ILLNESS
[FreeTextEntry1] : 79-year-old with Claudio 7 (4+3) prostate cancer and multiple cores with PSA of24.9. His prostate was 78 cc on transrectal ultrasound. Bone scan and MRI of the pelvis showed no metastasized and tumor confined to the prostate. There is no bone pain, no hematuria, but he does have lower urinary tract symptoms including urinary retention 2050 treated with tamsulosin and diminished urinary flow, frequency and urgency

## 2019-11-13 ENCOUNTER — OUTPATIENT (OUTPATIENT)
Dept: OUTPATIENT SERVICES | Facility: HOSPITAL | Age: 79
LOS: 1 days | Discharge: HOME | End: 2019-11-13

## 2019-11-13 ENCOUNTER — APPOINTMENT (OUTPATIENT)
Dept: RADIATION ONCOLOGY | Facility: HOSPITAL | Age: 79
End: 2019-11-13
Payer: MEDICARE

## 2019-11-13 VITALS
WEIGHT: 180 LBS | RESPIRATION RATE: 16 BRPM | DIASTOLIC BLOOD PRESSURE: 96 MMHG | BODY MASS INDEX: 34.01 KG/M2 | HEART RATE: 80 BPM | TEMPERATURE: 97.7 F | SYSTOLIC BLOOD PRESSURE: 142 MMHG

## 2019-11-13 DIAGNOSIS — C61 MALIGNANT NEOPLASM OF PROSTATE: ICD-10-CM

## 2019-11-13 DIAGNOSIS — Z80.0 FAMILY HISTORY OF MALIGNANT NEOPLASM OF DIGESTIVE ORGANS: ICD-10-CM

## 2019-11-13 PROCEDURE — 99204 OFFICE O/P NEW MOD 45 MIN: CPT

## 2019-11-13 RX ORDER — GABAPENTIN 300 MG/1
300 CAPSULE ORAL
Refills: 0 | Status: ACTIVE | COMMUNITY

## 2019-11-13 RX ORDER — CEPHALEXIN 500 MG/1
500 CAPSULE ORAL 4 TIMES DAILY
Qty: 28 | Refills: 0 | Status: DISCONTINUED | COMMUNITY
Start: 2019-04-04 | End: 2019-11-13

## 2019-11-13 RX ORDER — CIPROFLOXACIN HYDROCHLORIDE 500 MG/1
500 TABLET, FILM COATED ORAL
Qty: 20 | Refills: 0 | Status: DISCONTINUED | COMMUNITY
Start: 2019-04-03 | End: 2019-11-13

## 2019-11-13 RX ORDER — AMIODARONE HYDROCHLORIDE 400 MG/1
TABLET ORAL
Refills: 0 | Status: DISCONTINUED | COMMUNITY
End: 2019-11-13

## 2019-11-13 RX ORDER — CALCIUM CARBONATE/VITAMIN D3 600 MG-10
TABLET ORAL
Refills: 0 | Status: ACTIVE | COMMUNITY

## 2019-11-13 RX ORDER — BICALUTAMIDE 50 MG/1
50 TABLET ORAL DAILY
Qty: 30 | Refills: 1 | Status: ACTIVE | COMMUNITY
Start: 2019-11-13 | End: 1900-01-01

## 2019-11-13 RX ORDER — HYDROCHLOROTHIAZIDE 12.5 MG/1
TABLET ORAL
Refills: 0 | Status: ACTIVE | COMMUNITY

## 2019-11-13 RX ORDER — ASPIRIN 81 MG/1
81 TABLET, CHEWABLE ORAL
Refills: 0 | Status: ACTIVE | COMMUNITY

## 2019-11-13 RX ORDER — LOSARTAN POTASSIUM 100 MG/1
100 TABLET, FILM COATED ORAL
Refills: 0 | Status: ACTIVE | COMMUNITY

## 2019-11-13 RX ORDER — GLIPIZIDE 10 MG/1
10 TABLET ORAL
Refills: 0 | Status: ACTIVE | COMMUNITY

## 2019-11-13 RX ORDER — ATORVASTATIN CALCIUM 80 MG/1
TABLET, FILM COATED ORAL
Refills: 0 | Status: ACTIVE | COMMUNITY

## 2019-11-13 NOTE — HISTORY OF PRESENT ILLNESS
[FreeTextEntry1] : Kp Woodson is a 79 year old Danish speaker who declines the translation phone in favor of his daughter.  He is also hard of hearing.   His PSA from February was 25, I do not have a previous history.  On 8/26 he had a TRUS guided biopsy of the prostate that found prostate cancer in 8 out of 26 needles.  The predominant pattern was [place] 7 (4+3).  Bone scan is negative.  MRI done in October described two suspicious areas without SARAH, no pelvic nodes and a gland size of 73 cc.  Given his diabetes, surgery is not favored.  He is here to discuss his options with radiation.

## 2019-11-13 NOTE — REVIEW OF SYSTEMS
[Patient Intake Form Reviewed] : Patient intake form was reviewed [Urinary Frequency] : urinary frequency [Negative] : Allergic/Immunologic [FreeTextEntry5] : pain in legs (neuropathy) [FreeTextEntry9] : arthritis

## 2019-11-13 NOTE — REASON FOR VISIT
[Other: _________] : [unfilled] [Prostate Cancer] : prostate cancer [Other: _____] : [unfilled] [Patient Declined  Services] : - None: Patient declined  services [FreeTextEntry3] : Pt prefers to use daughter. Unable to hear.  [TWNoteComboBox1] : Eliazar

## 2019-11-13 NOTE — PHYSICAL EXAM
[Normal] : normal skin color and pigmentation and no rash [Normal] : normal external genitalia without lesions and no testicular masses [FreeTextEntry1] : The gland is enlarged, encroaching on the sulci.  it is firm but not hard.  no nodules.   [de-identified] : He requires help getting on th exam table and uses a cane (ECOG 2)

## 2019-11-13 NOTE — DISEASE MANAGEMENT
[Clinical] : TNM Stage: c [IIA] : IIA [TTNM] : 1c [MTNM] : 0 [NTNM] : 0 [de-identified] : Prostate

## 2019-11-13 NOTE — VITALS
[ECOG Performance Status: 2 - Ambulatory and capable of all self care but unable to carry out any work activities] : Performance Status: 2 - Ambulatory and capable of all self care but unable to carry out any work activities. Up and about more than 50% of waking hours

## 2019-12-03 ENCOUNTER — APPOINTMENT (OUTPATIENT)
Dept: UROLOGY | Facility: CLINIC | Age: 79
End: 2019-12-03
Payer: MEDICARE

## 2019-12-03 PROCEDURE — 99213 OFFICE O/P EST LOW 20 MIN: CPT

## 2019-12-03 NOTE — ASSESSMENT
[FreeTextEntry1] : Risks benefits alternatives to leuprolide discussed including hot flashes, blood clots, bony side effects. Patient agrees to proceed with leuprolide today. Continuing aspirin. 3 months Eligard given. lot [id number], exp 7/21

## 2019-12-03 NOTE — PHYSICAL EXAM
[General Appearance - In No Acute Distress] : no acute distress [Abdomen Soft] : soft [] : no respiratory distress [Abdomen Tenderness] : non-tender [Oriented To Time, Place, And Person] : oriented to person, place, and time [Normal Station and Gait] : the gait and station were normal for the patient's age

## 2019-12-03 NOTE — HISTORY OF PRESENT ILLNESS
[FreeTextEntry1] : Daughter provided translation. Patient with Claudio 7 prostate cancer here to start leuprolide. He is already started bicalutamide in preparation. He continues on tamsulosin for lower urinary tract symptoms. He saw a radiation oncologist Dr. Sanchez, and he recommends 6 months of leuprolide prior to starting radiation for purposes of reducing prostate size. [Urinary Incontinence] : no urinary incontinence [Urinary Urgency] : urinary urgency [Urinary Frequency] : urinary frequency [Nocturia] : nocturia [Weak Stream] : weak stream

## 2020-03-04 ENCOUNTER — APPOINTMENT (OUTPATIENT)
Dept: UROLOGY | Facility: CLINIC | Age: 80
End: 2020-03-04
Payer: MEDICARE

## 2020-03-04 VITALS — BODY MASS INDEX: 33.99 KG/M2 | WEIGHT: 180 LBS | HEIGHT: 61 IN

## 2020-03-04 PROCEDURE — 96402 CHEMO HORMON ANTINEOPL SQ/IM: CPT

## 2020-05-18 DIAGNOSIS — Z85.46 PERSONAL HISTORY OF MALIGNANT NEOPLASM OF PROSTATE: ICD-10-CM

## 2020-06-04 RX ORDER — AMOXICILLIN AND CLAVULANATE POTASSIUM 875; 125 MG/1; MG/1
875-125 TABLET, COATED ORAL
Qty: 6 | Refills: 0 | Status: ACTIVE | COMMUNITY
Start: 2020-06-04 | End: 1900-01-01

## 2021-04-25 ENCOUNTER — EMERGENCY (EMERGENCY)
Facility: HOSPITAL | Age: 81
LOS: 0 days | Discharge: HOME | End: 2021-04-25
Attending: EMERGENCY MEDICINE | Admitting: EMERGENCY MEDICINE
Payer: MEDICARE

## 2021-04-25 VITALS
DIASTOLIC BLOOD PRESSURE: 64 MMHG | RESPIRATION RATE: 18 BRPM | TEMPERATURE: 98 F | WEIGHT: 179.9 LBS | OXYGEN SATURATION: 98 % | SYSTOLIC BLOOD PRESSURE: 128 MMHG | HEIGHT: 65 IN | HEART RATE: 78 BPM

## 2021-04-25 VITALS
SYSTOLIC BLOOD PRESSURE: 131 MMHG | HEART RATE: 65 BPM | TEMPERATURE: 99 F | OXYGEN SATURATION: 98 % | DIASTOLIC BLOOD PRESSURE: 61 MMHG | RESPIRATION RATE: 18 BRPM

## 2021-04-25 DIAGNOSIS — I48.91 UNSPECIFIED ATRIAL FIBRILLATION: ICD-10-CM

## 2021-04-25 DIAGNOSIS — E11.65 TYPE 2 DIABETES MELLITUS WITH HYPERGLYCEMIA: ICD-10-CM

## 2021-04-25 DIAGNOSIS — Z79.899 OTHER LONG TERM (CURRENT) DRUG THERAPY: ICD-10-CM

## 2021-04-25 DIAGNOSIS — Z79.84 LONG TERM (CURRENT) USE OF ORAL HYPOGLYCEMIC DRUGS: ICD-10-CM

## 2021-04-25 DIAGNOSIS — I10 ESSENTIAL (PRIMARY) HYPERTENSION: ICD-10-CM

## 2021-04-25 DIAGNOSIS — E11.40 TYPE 2 DIABETES MELLITUS WITH DIABETIC NEUROPATHY, UNSPECIFIED: ICD-10-CM

## 2021-04-25 DIAGNOSIS — Z79.82 LONG TERM (CURRENT) USE OF ASPIRIN: ICD-10-CM

## 2021-04-25 DIAGNOSIS — R73.9 HYPERGLYCEMIA, UNSPECIFIED: ICD-10-CM

## 2021-04-25 LAB
ALBUMIN SERPL ELPH-MCNC: 4.1 G/DL — SIGNIFICANT CHANGE UP (ref 3.5–5.2)
ALP SERPL-CCNC: 86 U/L — SIGNIFICANT CHANGE UP (ref 30–115)
ALT FLD-CCNC: 10 U/L — SIGNIFICANT CHANGE UP (ref 0–41)
ANION GAP SERPL CALC-SCNC: 12 MMOL/L — SIGNIFICANT CHANGE UP (ref 7–14)
AST SERPL-CCNC: 16 U/L — SIGNIFICANT CHANGE UP (ref 0–41)
BASE EXCESS BLDV CALC-SCNC: 0.2 MMOL/L — SIGNIFICANT CHANGE UP (ref -2–2)
BASOPHILS # BLD AUTO: 0.06 K/UL — SIGNIFICANT CHANGE UP (ref 0–0.2)
BASOPHILS NFR BLD AUTO: 0.6 % — SIGNIFICANT CHANGE UP (ref 0–1)
BILIRUB SERPL-MCNC: 0.4 MG/DL — SIGNIFICANT CHANGE UP (ref 0.2–1.2)
BUN SERPL-MCNC: 30 MG/DL — HIGH (ref 10–20)
CALCIUM SERPL-MCNC: 9.9 MG/DL — SIGNIFICANT CHANGE UP (ref 8.5–10.1)
CHLORIDE SERPL-SCNC: 93 MMOL/L — LOW (ref 98–110)
CO2 SERPL-SCNC: 25 MMOL/L — SIGNIFICANT CHANGE UP (ref 17–32)
CREAT SERPL-MCNC: 1.9 MG/DL — HIGH (ref 0.7–1.5)
EOSINOPHIL # BLD AUTO: 0.19 K/UL — SIGNIFICANT CHANGE UP (ref 0–0.7)
EOSINOPHIL NFR BLD AUTO: 1.8 % — SIGNIFICANT CHANGE UP (ref 0–8)
GLUCOSE SERPL-MCNC: 298 MG/DL — HIGH (ref 70–99)
HCO3 BLDV-SCNC: 24 MMOL/L — SIGNIFICANT CHANGE UP (ref 22–29)
HCT VFR BLD CALC: 42.2 % — SIGNIFICANT CHANGE UP (ref 42–52)
HGB BLD-MCNC: 14.5 G/DL — SIGNIFICANT CHANGE UP (ref 14–18)
HOROWITZ INDEX BLDV+IHG-RTO: 21 — SIGNIFICANT CHANGE UP
IMM GRANULOCYTES NFR BLD AUTO: 0.2 % — SIGNIFICANT CHANGE UP (ref 0.1–0.3)
LACTATE BLDV-MCNC: 2.9 MMOL/L — HIGH (ref 0.5–1.6)
LIDOCAIN IGE QN: 27 U/L — SIGNIFICANT CHANGE UP (ref 7–60)
LYMPHOCYTES # BLD AUTO: 2.1 K/UL — SIGNIFICANT CHANGE UP (ref 1.2–3.4)
LYMPHOCYTES # BLD AUTO: 20 % — LOW (ref 20.5–51.1)
MCHC RBC-ENTMCNC: 29.2 PG — SIGNIFICANT CHANGE UP (ref 27–31)
MCHC RBC-ENTMCNC: 34.4 G/DL — SIGNIFICANT CHANGE UP (ref 32–37)
MCV RBC AUTO: 84.9 FL — SIGNIFICANT CHANGE UP (ref 80–94)
MONOCYTES # BLD AUTO: 0.63 K/UL — HIGH (ref 0.1–0.6)
MONOCYTES NFR BLD AUTO: 6 % — SIGNIFICANT CHANGE UP (ref 1.7–9.3)
NEUTROPHILS # BLD AUTO: 7.49 K/UL — HIGH (ref 1.4–6.5)
NEUTROPHILS NFR BLD AUTO: 71.4 % — SIGNIFICANT CHANGE UP (ref 42.2–75.2)
NRBC # BLD: 0 /100 WBCS — SIGNIFICANT CHANGE UP (ref 0–0)
PCO2 BLDV: 36 MMHG — LOW (ref 42–55)
PH BLDV: 7.44 — HIGH (ref 7.26–7.43)
PLATELET # BLD AUTO: 274 K/UL — SIGNIFICANT CHANGE UP (ref 130–400)
PO2 BLDV: 39 MMHG — SIGNIFICANT CHANGE UP (ref 20–40)
POTASSIUM SERPL-MCNC: 4.2 MMOL/L — SIGNIFICANT CHANGE UP (ref 3.5–5)
POTASSIUM SERPL-SCNC: 4.2 MMOL/L — SIGNIFICANT CHANGE UP (ref 3.5–5)
PROT SERPL-MCNC: 6.8 G/DL — SIGNIFICANT CHANGE UP (ref 6–8)
RBC # BLD: 4.97 M/UL — SIGNIFICANT CHANGE UP (ref 4.7–6.1)
RBC # FLD: 11.9 % — SIGNIFICANT CHANGE UP (ref 11.5–14.5)
SAO2 % BLDV: 76 % — SIGNIFICANT CHANGE UP
SODIUM SERPL-SCNC: 130 MMOL/L — LOW (ref 135–146)
WBC # BLD: 10.49 K/UL — SIGNIFICANT CHANGE UP (ref 4.8–10.8)
WBC # FLD AUTO: 10.49 K/UL — SIGNIFICANT CHANGE UP (ref 4.8–10.8)

## 2021-04-25 PROCEDURE — 99284 EMERGENCY DEPT VISIT MOD MDM: CPT

## 2021-04-25 RX ORDER — ACETAMINOPHEN 500 MG
650 TABLET ORAL ONCE
Refills: 0 | Status: COMPLETED | OUTPATIENT
Start: 2021-04-25 | End: 2021-04-25

## 2021-04-25 RX ORDER — KETOROLAC TROMETHAMINE 30 MG/ML
15 SYRINGE (ML) INJECTION ONCE
Refills: 0 | Status: DISCONTINUED | OUTPATIENT
Start: 2021-04-25 | End: 2021-04-25

## 2021-04-25 RX ORDER — SODIUM CHLORIDE 9 MG/ML
1000 INJECTION INTRAMUSCULAR; INTRAVENOUS; SUBCUTANEOUS ONCE
Refills: 0 | Status: COMPLETED | OUTPATIENT
Start: 2021-04-25 | End: 2021-04-25

## 2021-04-25 RX ADMIN — Medication 650 MILLIGRAM(S): at 19:51

## 2021-04-25 RX ADMIN — SODIUM CHLORIDE 1000 MILLILITER(S): 9 INJECTION INTRAMUSCULAR; INTRAVENOUS; SUBCUTANEOUS at 19:51

## 2021-04-25 RX ADMIN — Medication 15 MILLIGRAM(S): at 19:52

## 2021-04-25 NOTE — ED PROVIDER NOTE - CARE PLAN
Principal Discharge DX:	Hyperglycemia  Secondary Diagnosis:	Neuropathy  Secondary Diagnosis:	Non-compliance

## 2021-04-25 NOTE — ED PROVIDER NOTE - PROGRESS NOTE DETAILS
BARBARA: daughter and pt want to go home,  on CMP, which is baseline for pt. no evidence of acidosis or elevated AG. .pt to follow with PMD in am. Reviewed all results and necessity for follow up. Counseled on red flags and to return for them.  Patient appears well on discharge.

## 2021-04-25 NOTE — ED PROVIDER NOTE - OBJECTIVE STATEMENT
81 y/o M with PMH HTN, DM on metformin/glipizide, afib, diabetic neuropathy, deaf/mostly non-verbal had COVID vaccine 1 month ago presents with daughter for episode of hyperglycemia x today. no palliating/provoking factors.   He has not taken his diabetic medications since the covid vaccination, and that it typically is difficult to get him to take his medications.   She relates that he typically does not let her take his blood glucose, but when she does, he is in the 200-300s, and today she took it after he ate and it was 540.   She relates he otherwise has been well except for increased neuropathy in b/l legs since self-discontinuing his diabetic medications.   no other complaints.   on arrival BG in 300s.   no cp/sob/back pain/ab pain/n/v/d.   he has appointment with his PMD Dr. Mendoza in am.

## 2021-04-25 NOTE — ED PROVIDER NOTE - CLINICAL SUMMARY MEDICAL DECISION MAKING FREE TEXT BOX
Labs unremarkable except for glc 298, anion gap normal. Given IVF, Toradol abd Tylenol with improvement.  Will D/C to follow up with PCP.

## 2021-04-25 NOTE — ED PROVIDER NOTE - ATTENDING CONTRIBUTION TO CARE
I personally evaluated the patient. I reviewed the Resident’s or Physician Assistant’s note (as assigned above), and agree with the findings and plan except as documented in my note.  Chart reviewed.  H/O DM, neuropathy, non-compliant to meds, complains of elevated glc and worsening neuropathic pains on feet.  History taken with daughter as .  Exam shows alert patient in no distress, HEENT NCAT, lungs clear, RR S1S2, abdomens oft Nt +BS, no CCE, good distal pulses, neuro A&OX3 no deficits. I personally evaluated the patient. I reviewed the Resident’s or Physician Assistant’s note (as assigned above), and agree with the findings and plan except as documented in my note.  Chart reviewed.  H/O DM, neuropathy, non-compliant to meds, complains of elevated glc and worsening neuropathic pains on feet.  History taken with daughter as .  Exam shows alert patient in no distress, HEENT NCAT, lungs clear, RR S1S2, abdomen soft Nt +BS, no CCE, good distal pulses, neuro A&OX3 no deficits.

## 2021-04-25 NOTE — ED PROVIDER NOTE - PATIENT PORTAL LINK FT
You can access the FollowMyHealth Patient Portal offered by [facility] by registering at the following website: [email or web address]. By joining [employer]’s FollowMyHealth portal, you will also be able to view your health information using other applications (apps) compatible with our system.

## 2021-04-25 NOTE — ED ADULT TRIAGE NOTE - CHIEF COMPLAINT QUOTE
Brought in by daughter for high sugar 549 and lower extremity "neuropathy" pain. Daughter states she gave dose of Metformin PTA

## 2021-04-25 NOTE — ED ADULT NURSE NOTE - NSFALLRSKUNASSIST_ED_ALL_ED
Nondisplaced fractures of the right superior, lateral and medial orbital walls with extraconal hemorrhage and air.  Non-operative management.  5/18 Dr. Carney updated that pt remains hospitalized. No follow up needed.  Darrion Carney MD. Plastic Surgeon. [facility].   no

## 2021-04-25 NOTE — ED PROVIDER NOTE - NS ED ROS FT
Review of Systems    Constitutional: (-) fever   Eyes/ENT: (-) vision changes  Cardiovascular: (-) chest pain, (-) syncope (-) palpitations  Respiratory: (-) cough, (-) shortness of breath  Gastrointestinal: (-) vomiting, (-) diarrhea (-)black/bloody stools (-) abdominal pain  Genitourinary:  (-) dysuria   Musculoskeletal: (-) neck pain, (-) back pain, (-) leg pain/swelling  Integumentary: (-) rash, (-) edema  Neurological: (-) headache, (-) altered mental status   Allergic/Immunologic: (-) pruritus

## 2021-04-25 NOTE — ED ADULT NURSE NOTE - NSIMPLEMENTINTERV_GEN_ALL_ED
Implemented All Fall Risk Interventions:  [place] to call system. Call bell, personal items and telephone within reach. Instruct patient to call for assistance. Room bathroom lighting operational. Non-slip footwear when patient is off stretcher. Physically safe environment: no spills, clutter or unnecessary equipment. Stretcher in lowest position, wheels locked, appropriate side rails in place. Provide visual cue, wrist band, yellow gown, etc. Monitor gait and stability. Monitor for mental status changes and reorient to person, place, and time. Review medications for side effects contributing to fall risk. Reinforce activity limits and safety measures with patient and family.

## 2021-04-25 NOTE — ED PROVIDER NOTE - PHYSICAL EXAMINATION
PHYSICAL EXAM:    GENERAL: Alert, appears stated age, well appearing, non-toxic  SKIN: Warm, pink and dry.   HEAD: NC  EYE: Normal lids/conjunctiva  ENT: Normal hearing, patent oropharynx   NECK: +supple. No meningismus, or JVD  Pulm: Bilateral BS, normal resp effort, no wheezes, stridor, or retractions  CV: RRR, no M/R/G, 2+and = radial pulses  Abd: soft, non-tender, non-distended, no rebound/guarding.   Mskel: no erythema, cyanosis, edema. no calf tenderness.  Neuro: AAOx3. 5/5 strength throughout.

## 2021-05-04 ENCOUNTER — RX RENEWAL (OUTPATIENT)
Age: 81
End: 2021-05-04

## 2021-05-27 ENCOUNTER — EMERGENCY (EMERGENCY)
Facility: HOSPITAL | Age: 81
LOS: 0 days | Discharge: LEFT BEFORE DISCHARGE | End: 2021-05-27
Attending: EMERGENCY MEDICINE | Admitting: EMERGENCY MEDICINE
Payer: MEDICARE

## 2021-05-27 VITALS
RESPIRATION RATE: 20 BRPM | DIASTOLIC BLOOD PRESSURE: 78 MMHG | HEART RATE: 70 BPM | SYSTOLIC BLOOD PRESSURE: 160 MMHG | TEMPERATURE: 98 F | OXYGEN SATURATION: 100 %

## 2021-05-27 VITALS
SYSTOLIC BLOOD PRESSURE: 118 MMHG | DIASTOLIC BLOOD PRESSURE: 66 MMHG | OXYGEN SATURATION: 99 % | HEIGHT: 65 IN | HEART RATE: 76 BPM | RESPIRATION RATE: 18 BRPM | TEMPERATURE: 98 F

## 2021-05-27 DIAGNOSIS — Z20.822 CONTACT WITH AND (SUSPECTED) EXPOSURE TO COVID-19: ICD-10-CM

## 2021-05-27 DIAGNOSIS — I44.7 LEFT BUNDLE-BRANCH BLOCK, UNSPECIFIED: ICD-10-CM

## 2021-05-27 DIAGNOSIS — Z85.46 PERSONAL HISTORY OF MALIGNANT NEOPLASM OF PROSTATE: ICD-10-CM

## 2021-05-27 DIAGNOSIS — R07.9 CHEST PAIN, UNSPECIFIED: ICD-10-CM

## 2021-05-27 DIAGNOSIS — I10 ESSENTIAL (PRIMARY) HYPERTENSION: ICD-10-CM

## 2021-05-27 DIAGNOSIS — Z53.29 PROCEDURE AND TREATMENT NOT CARRIED OUT BECAUSE OF PATIENT'S DECISION FOR OTHER REASONS: ICD-10-CM

## 2021-05-27 DIAGNOSIS — Z79.899 OTHER LONG TERM (CURRENT) DRUG THERAPY: ICD-10-CM

## 2021-05-27 DIAGNOSIS — Z79.82 LONG TERM (CURRENT) USE OF ASPIRIN: ICD-10-CM

## 2021-05-27 DIAGNOSIS — I21.4 NON-ST ELEVATION (NSTEMI) MYOCARDIAL INFARCTION: ICD-10-CM

## 2021-05-27 DIAGNOSIS — E78.5 HYPERLIPIDEMIA, UNSPECIFIED: ICD-10-CM

## 2021-05-27 DIAGNOSIS — Z79.84 LONG TERM (CURRENT) USE OF ORAL HYPOGLYCEMIC DRUGS: ICD-10-CM

## 2021-05-27 DIAGNOSIS — E11.40 TYPE 2 DIABETES MELLITUS WITH DIABETIC NEUROPATHY, UNSPECIFIED: ICD-10-CM

## 2021-05-27 DIAGNOSIS — M25.511 PAIN IN RIGHT SHOULDER: ICD-10-CM

## 2021-05-27 LAB
ALBUMIN SERPL ELPH-MCNC: 4.3 G/DL — SIGNIFICANT CHANGE UP (ref 3.5–5.2)
ALP SERPL-CCNC: 68 U/L — SIGNIFICANT CHANGE UP (ref 30–115)
ALT FLD-CCNC: 12 U/L — SIGNIFICANT CHANGE UP (ref 0–41)
ANION GAP SERPL CALC-SCNC: 10 MMOL/L — SIGNIFICANT CHANGE UP (ref 7–14)
AST SERPL-CCNC: 16 U/L — SIGNIFICANT CHANGE UP (ref 0–41)
BASOPHILS # BLD AUTO: 0.04 K/UL — SIGNIFICANT CHANGE UP (ref 0–0.2)
BASOPHILS NFR BLD AUTO: 0.5 % — SIGNIFICANT CHANGE UP (ref 0–1)
BILIRUB SERPL-MCNC: 0.4 MG/DL — SIGNIFICANT CHANGE UP (ref 0.2–1.2)
BUN SERPL-MCNC: 23 MG/DL — HIGH (ref 10–20)
CALCIUM SERPL-MCNC: 9.3 MG/DL — SIGNIFICANT CHANGE UP (ref 8.5–10.1)
CHLORIDE SERPL-SCNC: 99 MMOL/L — SIGNIFICANT CHANGE UP (ref 98–110)
CO2 SERPL-SCNC: 27 MMOL/L — SIGNIFICANT CHANGE UP (ref 17–32)
CREAT SERPL-MCNC: 1.1 MG/DL — SIGNIFICANT CHANGE UP (ref 0.7–1.5)
EOSINOPHIL # BLD AUTO: 0.13 K/UL — SIGNIFICANT CHANGE UP (ref 0–0.7)
EOSINOPHIL NFR BLD AUTO: 1.6 % — SIGNIFICANT CHANGE UP (ref 0–8)
GLUCOSE SERPL-MCNC: 55 MG/DL — LOW (ref 70–99)
HCT VFR BLD CALC: 36.7 % — LOW (ref 42–52)
HGB BLD-MCNC: 12.3 G/DL — LOW (ref 14–18)
IMM GRANULOCYTES NFR BLD AUTO: 0.4 % — HIGH (ref 0.1–0.3)
LYMPHOCYTES # BLD AUTO: 1.51 K/UL — SIGNIFICANT CHANGE UP (ref 1.2–3.4)
LYMPHOCYTES # BLD AUTO: 18.7 % — LOW (ref 20.5–51.1)
MCHC RBC-ENTMCNC: 28.3 PG — SIGNIFICANT CHANGE UP (ref 27–31)
MCHC RBC-ENTMCNC: 33.5 G/DL — SIGNIFICANT CHANGE UP (ref 32–37)
MCV RBC AUTO: 84.4 FL — SIGNIFICANT CHANGE UP (ref 80–94)
MONOCYTES # BLD AUTO: 0.45 K/UL — SIGNIFICANT CHANGE UP (ref 0.1–0.6)
MONOCYTES NFR BLD AUTO: 5.6 % — SIGNIFICANT CHANGE UP (ref 1.7–9.3)
NEUTROPHILS # BLD AUTO: 5.93 K/UL — SIGNIFICANT CHANGE UP (ref 1.4–6.5)
NEUTROPHILS NFR BLD AUTO: 73.2 % — SIGNIFICANT CHANGE UP (ref 42.2–75.2)
NRBC # BLD: 0 /100 WBCS — SIGNIFICANT CHANGE UP (ref 0–0)
PLATELET # BLD AUTO: 225 K/UL — SIGNIFICANT CHANGE UP (ref 130–400)
POTASSIUM SERPL-MCNC: 4.5 MMOL/L — SIGNIFICANT CHANGE UP (ref 3.5–5)
POTASSIUM SERPL-SCNC: 4.5 MMOL/L — SIGNIFICANT CHANGE UP (ref 3.5–5)
PROT SERPL-MCNC: 6.7 G/DL — SIGNIFICANT CHANGE UP (ref 6–8)
RBC # BLD: 4.35 M/UL — LOW (ref 4.7–6.1)
RBC # FLD: 11.5 % — SIGNIFICANT CHANGE UP (ref 11.5–14.5)
SODIUM SERPL-SCNC: 136 MMOL/L — SIGNIFICANT CHANGE UP (ref 135–146)
TROPONIN T SERPL-MCNC: 0.02 NG/ML — HIGH
TROPONIN T SERPL-MCNC: 0.15 NG/ML — CRITICAL HIGH
WBC # BLD: 8.09 K/UL — SIGNIFICANT CHANGE UP (ref 4.8–10.8)
WBC # FLD AUTO: 8.09 K/UL — SIGNIFICANT CHANGE UP (ref 4.8–10.8)

## 2021-05-27 PROCEDURE — 93010 ELECTROCARDIOGRAM REPORT: CPT

## 2021-05-27 PROCEDURE — 71045 X-RAY EXAM CHEST 1 VIEW: CPT | Mod: 26

## 2021-05-27 PROCEDURE — 93010 ELECTROCARDIOGRAM REPORT: CPT | Mod: 77

## 2021-05-27 PROCEDURE — 73030 X-RAY EXAM OF SHOULDER: CPT | Mod: 26,RT

## 2021-05-27 PROCEDURE — 99285 EMERGENCY DEPT VISIT HI MDM: CPT

## 2021-05-27 RX ORDER — ACETAMINOPHEN 500 MG
650 TABLET ORAL ONCE
Refills: 0 | Status: COMPLETED | OUTPATIENT
Start: 2021-05-27 | End: 2021-05-27

## 2021-05-27 RX ORDER — IBUPROFEN 200 MG
600 TABLET ORAL ONCE
Refills: 0 | Status: COMPLETED | OUTPATIENT
Start: 2021-05-27 | End: 2021-05-27

## 2021-05-27 RX ORDER — ASPIRIN/CALCIUM CARB/MAGNESIUM 324 MG
325 TABLET ORAL ONCE
Refills: 0 | Status: COMPLETED | OUTPATIENT
Start: 2021-05-27 | End: 2021-05-27

## 2021-05-27 RX ORDER — NITROGLYCERIN 6.5 MG
0.4 CAPSULE, EXTENDED RELEASE ORAL ONCE
Refills: 0 | Status: COMPLETED | OUTPATIENT
Start: 2021-05-27 | End: 2021-05-27

## 2021-05-27 RX ORDER — GABAPENTIN 400 MG/1
600 CAPSULE ORAL ONCE
Refills: 0 | Status: DISCONTINUED | OUTPATIENT
Start: 2021-05-27 | End: 2021-05-27

## 2021-05-27 RX ADMIN — Medication 325 MILLIGRAM(S): at 16:08

## 2021-05-27 RX ADMIN — Medication 600 MILLIGRAM(S): at 16:08

## 2021-05-27 RX ADMIN — Medication 650 MILLIGRAM(S): at 11:21

## 2021-05-27 RX ADMIN — Medication 650 MILLIGRAM(S): at 12:55

## 2021-05-27 NOTE — ED PROVIDER NOTE - OBJECTIVE STATEMENT
81 year old male, past medical history htn, hdl, dm, diabetic neuropathy, who presents with right shoulder pain radiating to chest that began last night. denies recent falls/trauma. patient follows w/ dr keith, no recent stress test, no smoking. no f/c, headache, nausea/vomiting, numbness/paresthesias/weakness. no hx similar.

## 2021-05-27 NOTE — ED ADULT NURSE REASSESSMENT NOTE - NS ED NURSE REASSESS COMMENT FT1
pt doesn't want to wait for lab results and wants to leave. pt insists to lowering the side rails despite providing teaching at length about fall risk due to neuropathy

## 2021-05-27 NOTE — ED PROVIDER NOTE - PROGRESS NOTE DETAILS
BH: Patient came in for chest pain/palpitations I intend to get a bedside echo chantel: The patient has decided to leave against medical advice.  The patient is AAOx3, not intoxicated, and displays normal decision making ability. We discussed all risks, benefits, and alternatives to the progression of treatment and the potential dangers of leaving including but not limited to permanent disability, injury, and death. The patient was instructed that they are welcome to change their decision to leave against medical advice and return to the emergency department at any time and for any reason in order to allow us to render care. KIM: Cards aware. will evaluate patient in ED. KIM: Cards aware. will evaluate patient in ED. VSS. patient given aspirin. KIM: sagar aware of patient, agrees with plan. KIM: cardiology evaluated patient, seen by cards fellow, dr winter, recommend admission to tele, heparin. BH: Patient AO x 3, clear speech and steady gait, is clinically sober, free of distracting injury, appears to have intact reason, insight and judgment. Therefore, in my opinion has medical decision making capacity. The patient was advised by myself and a cardiologist that they need to be admitted since they are having a heart attack. The patient indicates that they cannot be confined to a bed or stay in the hospital any longer. The patient's family member at bedside and the patient's cardiologist (dr keith) was unable to convince the patient to stay. They were advised of the risks of leaving AMA. The patient was advised that they could become critically ill, which could lead to complications including, but not limited to, death and disability. The patient indicates they understand these risks and signed AMA. The pt was also encouraged to return to the emergency department at any time or see their doctor as soon as possible. The patient was given discharge instructions, offered the opportunity to ask questions and verbalized that they understand the discharge instructions. KIM: The patient has decided to leave against medical advice.  The patient is AAOx3, not intoxicated, and displays normal decision making ability. We discussed all risks, benefits, and alternatives to the progression of treatment and the potential dangers of leaving including but not limited to permanent disability, injury, and death. The patient was instructed that they are welcome to change their decision to leave against medical advice and return to the emergency department at any time and for any reason in order to allow us to render care.  family at bedside,  used, patient verbalizes understanding of risk of going home as this will likely result in death. patient leaving AMA. cards aware, CEKA aware.

## 2021-05-27 NOTE — ED PROVIDER NOTE - NS ED ROS FT
Review of Systems:  	•	CONSTITUTIONAL: no fever, no diaphoresis, no chills  	•	SKIN: no rash  	•	HEMATOLOGIC: no bleeding, no bruising  	•	EYES: no eye pain, no blurry vision  	•	ENT: no change in hearing, no sore throat, no ear pain or tinnitus  	•	RESPIRATORY: no shortness of breath, no cough  	•	CARDIAC: +chest pain, no palpitations  	•	GI: no abd pain, no nausea, no vomiting, no diarrhea  	•	GENITO-URINARY: no discharge, no dysuria; no hematuria  	•	MUSCULOSKELETAL: +R shoulder pain, no joint swelling/redness   	•	NEUROLOGIC: no weakness, no numbness, no paresthesias, no headache   	•	PSYCH: no anxiety, non suicidal, non homicidal, no hallucination, no depression

## 2021-05-27 NOTE — ED PROVIDER NOTE - PATIENT PORTAL LINK FT
You can access the FollowMyHealth Patient Portal offered by [facility] by registering at the following website: [email or web address]. By joining [employer]’s FollowMyHealth portal, you will also be able to view your health information using other applications (apps) compatible with our system.

## 2021-05-27 NOTE — ED ADULT NURSE REASSESSMENT NOTE - NS ED NURSE REASSESS COMMENT FT1
pt signed AMA and left the ED with the daughter. Discussion at length was done to educate patient and daughter of the risks of leaving the ED.

## 2021-05-27 NOTE — ED PROVIDER NOTE - NSFOLLOWUPINSTRUCTIONS_ED_ALL_ED_FT
Please follow up with your primary care doctor in 1-3 days  Please be aware of any new or worsening signs or symptoms that should prompt your return to the ER.    Nonspecific Chest Pain  Chest pain can be caused by many different conditions. There is always a chance that your pain could be related to something serious, such as a heart attack or a blood clot in your lungs. Chest pain can also be caused by conditions that are not life-threatening. If you have chest pain, it is very important to follow up with your health care provider.    What are the causes?  Causes of this condition include:    Heartburn.  Pneumonia or bronchitis.  Anxiety or stress.  Inflammation around your heart (pericarditis) or lung (pleuritis or pleurisy).  A blood clot in your lung.  A collapsed lung (pneumothorax). This can develop suddenly on its own (spontaneous pneumothorax) or from trauma to the chest.  Shingles infection (varicella-zoster virus).  Heart attack.  Damage to the bones, muscles, and cartilage that make up your chest wall. This can include:    Bruised bones due to injury.  Strained muscles or cartilage due to frequent or repeated coughing or overwork.  Fracture to one or more ribs.  Sore cartilage due to inflammation (costochondritis).      What increases the risk?  Risk factors for this condition may include:    Activities that increase your risk for trauma or injury to your chest.  Respiratory infections or conditions that cause frequent coughing.  Medical conditions or overeating that can cause heartburn.  Heart disease or family history of heart disease.  Conditions or health behaviors that increase your risk of developing a blood clot.  Having had chicken pox (varicella zoster).    What are the signs or symptoms?  Chest pain can feel like:    Burning or tingling on the surface of your chest or deep in your chest.  Crushing, pressure, aching, or squeezing pain.  Dull or sharp pain that is worse when you move, cough, or take a deep breath.  Pain that is also felt in your back, neck, shoulder, or arm, or pain that spreads to any of these areas.    Your chest pain may come and go, or it may stay constant.    How is this diagnosed?  Lab tests or other studies may be needed to find the cause of your pain. Your health care provider may have you take a test called an ECG (electrocardiogram). An ECG records your heartbeat patterns at the time the test is performed. You may also have other tests, such as:    Transthoracic echocardiogram (TTE). In this test, sound waves are used to create a picture of the heart structures and to look at how blood flows through your heart.  Transesophageal echocardiogram (JUANA). This is a more advanced imaging test that takes images from inside your body. It allows your health care provider to see your heart in finer detail.  Cardiac monitoring. This allows your health care provider to monitor your heart rate and rhythm in real time.  Holter monitor. This is a portable device that records your heartbeat and can help to diagnose abnormal heartbeats. It allows your health care provider to track your heart activity for several days, if needed.  Stress tests. These can be done through exercise or by taking medicine that makes your heart beat more quickly.  Blood tests.  Other imaging tests.    How is this treated?  Treatment depends on what is causing your chest pain. Treatment may include:    Medicines. These may include:    Acid blockers for heartburn.  Anti-inflammatory medicine.  Pain medicine for inflammatory conditions.  Antibiotic medicine, if an infection is present.  Medicines to dissolve blood clots.  Medicines to treat coronary artery disease (CAD).    Supportive care for conditions that do not require medicines. This may include:    Resting.  Applying heat or cold packs to injured areas.  Limiting activities until pain decreases.      Follow these instructions at home:  Medicines     If you were prescribed an antibiotic, take it as told by your health care provider. Do not stop taking the antibiotic even if you start to feel better.  Take over-the-counter and prescription medicines only as told by your health care provider.  Lifestyle     Do not use any products that contain nicotine or tobacco, such as cigarettes and e-cigarettes. If you need help quitting, ask your health care provider.  Do not drink alcohol.  ImageMake lifestyle changes as directed by your health care provider. These may include:    Getting regular exercise. Ask your health care provider to suggest some activities that are safe for you.  Eating a heart-healthy diet. A registered dietitian can help you to learn healthy eating options.  Maintaining a healthy weight.  Managing diabetes, if necessary.  Reducing stress, such as with yoga or relaxation techniques.    General instructions     Avoid any activities that bring on chest pain.  If heartburn is the cause for your chest pain, raise (elevate) the head of your bed about 6 inches (15 cm) by putting blocks under the legs. Sleeping with more pillows does not effectively relieve heartburn because it only changes the position of your head.  Keep all follow-up visits as told by your health care provider. This is important. This includes any further testing if your chest pain does not go away.  Contact a health care provider if:  Your chest pain does not go away.  You have a rash with blisters on your chest.  You have a fever.  You have chills.  Get help right away if:  Your chest pain is worse.  You have a cough that gets worse, or you cough up blood.  You have severe pain in your abdomen.  You have severe weakness.  You faint.  You have sudden, unexplained chest discomfort.  You have sudden, unexplained discomfort in your arms, back, neck, or jaw.  You have shortness of breath at any time.  You suddenly start to sweat, or your skin gets clammy.  You feel nauseous or you vomit.  You suddenly feel light-headed or dizzy.  Your heart begins to beat quickly, or it feels like it is skipping beats.  These symptoms may represent a serious problem that is an emergency. Do not wait to see if the symptoms will go away. Get medical help right away. Call your local emergency services (911 in the [place] Do not drive yourself to the hospital.     This information is not intended to replace advice given to you by your health care provider. Make sure you discuss any questions you have with your health care provider.    Shoulder Pain    Many things can cause shoulder pain, including:    An injury.  Moving the arm in the same way again and again (overuse).  Joint pain (arthritis).    Follow these instructions at home:  Take these actions to help with your pain:    Squeeze a soft ball or a foam pad as much as you can. This helps to prevent swelling. It also makes the arm stronger.  Take over-the-counter and prescription medicines only as told by your doctor.  If told, put ice on the area:    Put ice in a plastic bag.  Place a towel between your skin and the bag.  Leave the ice on for 20 minutes, 2–3 times per day. Stop putting on ice if it does not help with the pain.    If you were given a shoulder sling or immobilizer:    Wear it as told.  Remove it to shower or bathe.  Move your arm as little as possible.  Keep your hand moving. This helps prevent swelling.      Contact a doctor if:  Your pain gets worse.  Medicine does not help your pain.  You have new pain in your arm, hand, or fingers.  Get help right away if:  Your arm, hand, or fingers:    Tingle.  Are numb.  Are swollen.  Are painful.  Turn white or blue.    This information is not intended to replace advice given to you by your health care provider. Make sure you discuss any questions you have with your health care provider. You are having a heart attack. It is advised that you do not leave the hospital as it is HIGHLY LIKELY that you will die.     Please return to the ER immediately.   What is a heart attack?  A heart attack is what happens when one of the arteries that supply blood to the heart gets blocked or partly blocked. When this happens, the part of the heart that normally gets blood from that artery is damaged. The longer the time the artery is blocked, the bigger the heart attack. The arteries that supply blood to the heart are called the "coronary arteries."    Heart attacks are usually the result of a condition called "coronary heart disease" or "coronary artery disease." In this disease, fatty deposits called plaques form on the walls of the coronary arteries (figure 1). These plaques sometimes break open and cause blood clots to form. Then the blood clot can block off the artery and keep blood from reaching parts of the heart muscle. That is what causes most heart attacks (figure 2).    The medical term for a heart attack is "myocardial infarction" or "MI."    What are the symptoms of a heart attack?  People having a heart attack often notice (figure 3):    ?Pain, pressure, or discomfort in the chest    ?Pain, tingling, or discomfort in other parts of the upper body, including the arms, back, neck, jaw, or stomach    ?Shortness of breath    ?Nausea, vomiting, burping, or heartburn    ?Sweating or cold, clammy skin    ?A racing or uneven heartbeat    ?Feeling dizzy or lightheaded    If you think you might be having a heart attack, call 9-1-1 right away. Do not try to get to the hospital on your own.    Is there a test for heart attacks?  Yes. If your doctor thinks you are having a heart attack, they might order one or more of these tests:    ?An electrocardiogram (ECG) – This test measures the electrical activity in your heart.    ?Blood tests – During a heart attack, the heart releases certain chemicals. If these chemicals are in your blood, it might mean you are having a heart attack.    ?Echocardiogram – This test uses sound waves to create an image of your heart as it beats. In a heart attack, not all parts or the heart pump normally.    ?Cardiac catheterization (also called "cardiac cath") – During this test, the doctor puts a thin tube into a blood vessel in your leg or arm. Then they move the tube up to your heart. Next, the doctor puts a dye that shows up on X-ray into the tube. This part of the test is called "coronary angiography." It can show whether any of the arteries in your heart are clogged.    How is a heart attack treated?  If you go to the hospital while you are having a heart attack, the doctors and nurses will do a few things:    ?They might give you oxygen through a mask or a tube in your nose.    ?They will give you pain medicines to ease the chest pain and discomfort of a heart attack. They might also give you something to help you relax.    ?They will give you medicines to help keep more blood clots from forming.    ?They might give you a medicine called a beta blocker to reduce your heart's need for oxygen. This medicine can help reduce the damage caused by a heart attack.    ?They will try to get blood flowing again through the clogged artery. Doctors can do this in 1 of 2 ways:    •They can give you medicines through a thin tube that goes into a vein, called an "IV," to break up clots. These have been called "clot busters."    •They can do a procedure called "stenting" combination with cardiac catheterization. This involves putting a tiny metal tube called a "stent" into the blocked artery to hold it open.    ?If you cannot get a stent, or if the stent does not work, your doctor might suggest open heart surgery. This is also called "coronary artery bypass grafting" or "bypass surgery" (figure 4). During this surgery, doctors create a new path – a detour – for blood to get around the clogged portion of the artery. They do this using a combination of your own arteries and veins.    You will likely stay in the hospital for 3 to 5 days, unless your heart attack led to other problems that need treatment.    What happens after a heart attack?  After you've had a heart attack, you will probably need to:    ?Take more medicines than before. It is very important that you take all your medicines every day, as directed. The medicines given to people who have had a heart attack can help prevent other heart attacks, and they reduce the chances of having a stroke or dying. If you can't afford your medicines, or if the medicines give you side effects, mention it to your doctor. There are often ways to solve these problems.    ?Improve the way you eat. Try to avoid fried foods and too many foods with sugar. Eat lots of fruits and vegetables. Try to eat foods that have fiber in them.    ?Lose weight, if you are overweight. Losing extra weight reduces the chance of another heart attack and can make you feel better.    ?Become more active. Walking, gardening, or any activity that gets you to move more can help reduce the risk.

## 2021-05-27 NOTE — ED ADULT NURSE NOTE - OBJECTIVE STATEMENT
pt presents with right shoulder pain radiates to the chest started yesterday. pt denies trauma or SOB.

## 2021-05-27 NOTE — ED PROVIDER NOTE - ATTENDING CONTRIBUTION TO CARE
82 y/o male h/o HTN, DM, PAF, prostate CA, denies PSH now p/w rt shoulder pain x last night, sx are constant, worse with certain movements and position, better with rest, radiate to rt chest, denies exertional nature to pain, SOB, nausea, vomiting, diaphoresis, hemoptysis, fever, tactile temp, chills, paresthesias, focal weakness, or other associated complaints at present. Pt denies recent heavy lifting or trauma.     Pt declined SL services or , reads lips, daughter at bedside assisting.  Old chart reviewed.  I have reviewed and agree with the initial nursing note, except as documented in my note.    VSS, awake, alert, non-toxic appearing, sitting comfortably, in NAD, no skin rash or lesions, chest CTAB, no w/r/r, +S1/S2, RRR, no m/r/g, abdomen soft, NT, ND, +BS, RUE: No scapular, clavicle, AC joint, elbow, wrist, hand tenderness, motor and sensation intact distally, NV intact distally with 2+ radial pulses, rt shoulder; reproducible ttp and pain elicited with movement, appears symmetrical, no gross deformity, no anterior fullness of anterior shoulder, humeral head palpated in glenohumeral joint, no crepitus, point tenderness, erythema, warmth, skin breakdown or lesions, diffusely tender to palpation, sensation intact over deltoid region, active ROM painful but intact, passive ROM intact.

## 2021-05-27 NOTE — ED PROVIDER NOTE - CARE PLAN
Principal Discharge DX:	Shoulder pain  Secondary Diagnosis:	Chest pain   Principal Discharge DX:	NSTEMI (non-ST elevated myocardial infarction)

## 2021-05-27 NOTE — ED PROVIDER NOTE - PHYSICAL EXAMINATION
CONSTITUTIONAL: Well-developed; well-nourished; in no acute distress, nontoxic appearing  SKIN: skin exam is warm and dry  HEAD: Normocephalic; atraumatic  EYES: PERRL, 3 mm bilateral, no nystagmus, EOM intact; conjunctiva and sclera clear.  ENT: MMM  NECK: ROM intact.  CARD: S1, S2 normal, no murmur  RESP: No wheezes, rales or rhonchi. Good air movement bilaterally  ABD: soft; non-distended; non-tender.   EXT: RUE: TTP overlying proximal R shoulder, no deformity, pulses 2+. passive ROM intact  NEURO: awake, alert, following commands, oriented, grossly unremarkable. No Focal deficits. GCS 15.   PSYCH: Cooperative, appropriate.

## 2021-05-28 LAB
RAPID RVP RESULT: SIGNIFICANT CHANGE UP
SARS-COV-2 RNA SPEC QL NAA+PROBE: SIGNIFICANT CHANGE UP

## 2021-08-21 ENCOUNTER — EMERGENCY (EMERGENCY)
Facility: HOSPITAL | Age: 81
LOS: 0 days | Discharge: HOME | End: 2021-08-21
Attending: EMERGENCY MEDICINE | Admitting: EMERGENCY MEDICINE
Payer: MEDICARE

## 2021-08-21 VITALS
HEIGHT: 65 IN | DIASTOLIC BLOOD PRESSURE: 103 MMHG | SYSTOLIC BLOOD PRESSURE: 227 MMHG | TEMPERATURE: 98 F | OXYGEN SATURATION: 98 % | WEIGHT: 160.06 LBS | HEART RATE: 116 BPM | RESPIRATION RATE: 18 BRPM

## 2021-08-21 VITALS
HEART RATE: 89 BPM | OXYGEN SATURATION: 96 % | SYSTOLIC BLOOD PRESSURE: 164 MMHG | DIASTOLIC BLOOD PRESSURE: 76 MMHG | RESPIRATION RATE: 19 BRPM | TEMPERATURE: 99 F

## 2021-08-21 DIAGNOSIS — R33.9 RETENTION OF URINE, UNSPECIFIED: ICD-10-CM

## 2021-08-21 DIAGNOSIS — Z79.82 LONG TERM (CURRENT) USE OF ASPIRIN: ICD-10-CM

## 2021-08-21 DIAGNOSIS — Z79.899 OTHER LONG TERM (CURRENT) DRUG THERAPY: ICD-10-CM

## 2021-08-21 DIAGNOSIS — I10 ESSENTIAL (PRIMARY) HYPERTENSION: ICD-10-CM

## 2021-08-21 DIAGNOSIS — Z87.891 PERSONAL HISTORY OF NICOTINE DEPENDENCE: ICD-10-CM

## 2021-08-21 DIAGNOSIS — Z86.69 PERSONAL HISTORY OF OTHER DISEASES OF THE NERVOUS SYSTEM AND SENSE ORGANS: ICD-10-CM

## 2021-08-21 DIAGNOSIS — E11.9 TYPE 2 DIABETES MELLITUS WITHOUT COMPLICATIONS: ICD-10-CM

## 2021-08-21 DIAGNOSIS — Z79.84 LONG TERM (CURRENT) USE OF ORAL HYPOGLYCEMIC DRUGS: ICD-10-CM

## 2021-08-21 LAB
APPEARANCE UR: ABNORMAL
BACTERIA # UR AUTO: NEGATIVE — SIGNIFICANT CHANGE UP
BILIRUB UR-MCNC: NEGATIVE — SIGNIFICANT CHANGE UP
COLOR SPEC: YELLOW — SIGNIFICANT CHANGE UP
DIFF PNL FLD: ABNORMAL
EPI CELLS # UR: 0 /HPF — SIGNIFICANT CHANGE UP (ref 0–5)
GLUCOSE UR QL: ABNORMAL
HYALINE CASTS # UR AUTO: 1 /LPF — SIGNIFICANT CHANGE UP (ref 0–7)
KETONES UR-MCNC: NEGATIVE — SIGNIFICANT CHANGE UP
LEUKOCYTE ESTERASE UR-ACNC: NEGATIVE — SIGNIFICANT CHANGE UP
NITRITE UR-MCNC: NEGATIVE — SIGNIFICANT CHANGE UP
PH UR: 6 — SIGNIFICANT CHANGE UP (ref 5–8)
PROT UR-MCNC: ABNORMAL
RBC CASTS # UR COMP ASSIST: 384 /HPF — HIGH (ref 0–4)
SP GR SPEC: 1.02 — SIGNIFICANT CHANGE UP (ref 1.01–1.03)
UROBILINOGEN FLD QL: SIGNIFICANT CHANGE UP
WBC UR QL: 3 /HPF — SIGNIFICANT CHANGE UP (ref 0–5)

## 2021-08-21 PROCEDURE — 51702 INSERT TEMP BLADDER CATH: CPT

## 2021-08-21 PROCEDURE — 99284 EMERGENCY DEPT VISIT MOD MDM: CPT | Mod: 25

## 2021-08-21 NOTE — ED ADULT NURSE NOTE - NSIMPLEMENTINTERV_GEN_ALL_ED
Implemented All Fall with Harm Risk Interventions:  Ot[place] to call system. Call bell, personal items and telephone within reach. Instruct patient to call for assistance. Room bathroom lighting operational. Non-slip footwear when patient is off stretcher. Physically safe environment: no spills, clutter or unnecessary equipment. Stretcher in lowest position, wheels locked, appropriate side rails in place. Provide visual cue, wrist band, yellow gown, etc. Monitor gait and stability. Monitor for mental status changes and reorient to person, place, and time. Review medications for side effects contributing to fall risk. Reinforce activity limits and safety measures with patient and family. Provide visual clues: red socks.

## 2021-08-21 NOTE — ED PROVIDER NOTE - OBJECTIVE STATEMENT
pt with pmhx DM and HTN presents to ED reporting inability to void since last night. never required magallanes previously. Denies fever/chill/HA/dizziness/chest pain/palpitation/sob/abd pain/n/v/d/ black stool/bloody stool

## 2021-08-21 NOTE — ED PROVIDER NOTE - NSFOLLOWUPINSTRUCTIONS_ED_ALL_ED_FT
Acute Urinary Retention, Male  Acute urinary retention is a condition in which a person is unable to pass urine. This can last for a short time or for a long time. If left untreated, it can result in kidney damage or other serious complications.    What are the causes?  This condition may be caused by:    Obstruction or narrowing of the tube that drains the bladder (urethra). This may be caused by surgery or problems with nearby organs, such as the prostate gland, which can press or squeeze the urethra.  Problems with the nerves in the bladder. These can be caused by diseases, such as multiple sclerosis, or by spinal cord injuries.  Certain medicines.  Tumors in the area of the pelvis, bladder, or urethra.  Diabetes.  Degenerative cognitive conditions such as delirium or dementia.  Bladder or urinary tract infection.  Constipation.  Blood in the urine (hematuria).  Injury to the bladder or urethra.   Psychological (psychogenic) conditions. Someone may hold his urine due to trauma or because he does not want to use the bathroom.    What increases the risk?  This condition is more likely to develop in older men. As men age, their prostate may become larger and may start pressing or squeezing on the bladder or the urethra.    What are the signs or symptoms?  Symptoms of this condition include:    Trouble urinating.  Pain in the lower abdomen.    Symptoms usually come on slowly over a long period of time.    How is this diagnosed?  This condition is diagnosed based on a physical exam and a medical history. You may also have other tests, including:    An ultrasound of the bladder or kidneys or both.  Blood tests.  A urine analysis.  Additional tests may be needed such as an MRI, kidney, or bladder function tests.    How is this treated?  Treatment for this condition may include:    Medicines.  Placing a thin, sterile tube (catheter) into the bladder to drain urine out of the body. This is called an indwelling urinary catheter. After being inserted, the catheter is held in place with a small balloon that is filled with sterile water. Urine drains from the catheter into a collection bag outside of the body.  Behavioral therapy.  Treatment for any underlying conditions.  If needed, you may be treated in the hospital for kidney function problems or to manage other complications.    Follow these instructions at home:  Take over-the-counter and prescription medicines only as told by your health care provider. Avoid certain medicines, such as decongestants, antihistamines, and some prescription medicines. Do not take any medicine unless your health care provider has approved.  If you were given an indwelling urinary catheter, take care of it as told by your health care provider.  Drink enough fluid to keep your urine clear or pale yellow.  If you were prescribed an antibiotic, take it as told by your health care provider. Do not stop taking the antibiotic even if you start to feel better.  Do not use any products that contain nicotine or tobacco, such as cigarettes and e-cigarettes. If you need help quitting, ask your health care provider.  Monitor any changes in your symptoms. Tell your health care provider about any changes.  If instructed, monitor your blood pressure at home. Report changes as told by your health care provider.  Keep all follow-up visits as told by your health care provider. This is important.  Contact a health care provider if:  You have uncomfortable bladder contractions that you cannot control (spasms) or you leak urine with the spasms.  Get help right away if:  You have chills or fever.  You have blood in your urine.  You have a catheter and:    Your catheter stops draining urine.  Your catheter falls out.    Summary  Acute urinary retention is a condition in which a person is unable to pass urine. If left untreated, it can result in kidney damage or other serious complications.  The cause of this condition may include an enlarged prostate. As men age, their prostate gland may become larger and may start pressing or squeezing on the bladder or the urethra.  Treatment for this condition may include medicines and placement of an indwelling urinary catheter.  Monitor any changes in your symptoms. Tell your health care provider about any changes.  This information is not intended to replace advice given to you by your health care provider. Make sure you discuss any questions you have with your health care provider.    Mccormick Catheter Care, Adult    A Mccormick catheter is a soft, flexible tube that is placed into the bladder to drain urine. A Mccormick catheter may be inserted if:    You leak urine or are not able to control when you urinate (urinary incontinence).  You are not able to urinate when you need to (urinary retention).   You had prostate surgery or surgery on the genitals.  You have certain medical conditions, such as multiple sclerosis, dementia, or a spinal cord injury.    If you are going home with a Mccormick catheter in place, follow the instructions below.    TAKING CARE OF THE CATHETER   Wash your hands with soap and water.   Using mild soap and warm water on a clean washcloth:    Clean the area on your body closest to the catheter insertion site using a circular motion, moving away from the catheter. Never wipe toward the catheter because this could sweep bacteria up into the urethra and cause infection.   Remove all traces of soap. Pat the area dry with a clean towel. For males, reposition the foreskin.    Attach the catheter to your leg so there is no tension on the catheter. Use adhesive tape or a leg strap. If you are using adhesive tape, remove any sticky residue left behind by the previous tape you used.   Keep the drainage bag below the level of the bladder, but keep it off the floor.   Check throughout the day to be sure the catheter is working and urine is draining freely. Make sure the tubing does not become kinked.  Do not pull on the catheter or try to remove it. Pulling could damage internal tissues.    TAKING CARE OF THE DRAINAGE BAGS  You will be given two drainage bags to take home. One is a large overnight drainage bag, and the other is a smaller leg bag that fits underneath clothing. You may wear the overnight bag at any time, but you should never wear the smaller leg bag at night. Follow the instructions below for how to empty, change, and clean your drainage bags.    Emptying the Drainage Bag    You must empty your drainage bag when it is ?–½ full or at least 2–3 times a day.     Wash your hands with soap and water.   Keep the drainage bag below your hips, below the level of your bladder. This stops urine from going back into the tubing and into your bladder.    Hold the dirty bag over the toilet or a clean container.   Open the pour spout at the bottom of the bag and empty the urine into the toilet or container. Do not let the pour spout touch the toilet, container, or any other surface. Doing so can place bacteria on the bag, which can cause an infection.   Clean the pour spout with a gauze pad or cotton ball that has rubbing alcohol on it.   Close the pour spout.   Attach the bag to your leg with adhesive tape or a leg strap.   Wash your hands well.    Changing the Drainage Bag    Change your drainage bag once a month or sooner if it starts to smell bad or look dirty. Below are steps to follow when changing the drainage bag.     Wash your hands with soap and water.   Pinch off the rubber catheter so that urine does not spill out.   Disconnect the catheter tube from the drainage tube at the connection valve. Do not let the tubes touch any surface.    Clean the end of the catheter tube with an alcohol wipe. Use a different alcohol wipe to clean the end of the drainage tube.   Connect the catheter tube to the drainage tube of the clean drainage bag.   Attach the new bag to the leg with adhesive tape or a leg strap. Avoid attaching the new bag too tightly.    Wash your hands well.    Cleaning the Drainage Bag     Wash your hands with soap and water.   Wash the bag in warm, soapy water.   Rinse the bag thoroughly with warm water.   Fill the bag with a solution of white vinegar and water (1 cup vinegar to 1 qt warm water [.2 L vinegar to 1 L warm water]). Close the bag and soak it for 30 minutes in the solution.    Rinse the bag with warm water.    Hang the bag to dry with the pour spout open and hanging downward.   Store the clean bag (once it is dry) in a clean plastic bag.   Wash your hands well.     PREVENTING INFECTION  Wash your hands before and after handling your catheter.  Take showers daily and wash the area where the catheter enters your body. Do not take baths. Replace wet leg straps with dry ones, if this applies.  Do not use powders, sprays, or lotions on the genital area. Only use creams, lotions, or ointments as directed by your caregiver.  For females, wipe from front to back after each bowel movement.   Drink enough fluids to keep your urine clear or pale yellow unless you have a fluid restriction.   Do not let the drainage bag or tubing touch or lie on the floor.   Wear cotton underwear to absorb moisture and to keep your .    SEEK MEDICAL CARE IF:  Your urine is cloudy or smells unusually bad.  Your catheter becomes clogged.  You are not draining urine into the bag or your bladder feels full.  Your catheter starts to leak.    SEEK IMMEDIATE MEDICAL CARE IF:  You have pain, swelling, redness, or pus where the catheter enters the body.  You have pain in the abdomen, legs, lower back, or bladder.  You have a fever.  You see blood fill the catheter, or your urine is pink or red.  You have nausea, vomiting, or chills.  Your catheter gets pulled out.    MAKE SURE YOU:  Understand these instructions.  Will watch your condition.  Will get help right away if you are not doing well or get worse.    ADDITIONAL NOTES AND INSTRUCTIONS    Please follow up with your Primary MD in 24-48 hr.  Seek immediate medical care for any new/worsening signs or symptoms.

## 2021-08-21 NOTE — ED PROVIDER NOTE - ATTENDING CONTRIBUTION TO CARE
81 year old male pmhx as documented presenting with urinary retention since last night. Patient has never had this issue before per daughter at bedside. Patient otherwise has been in his usual state of health. Denies fevers, trauma, LE weakness/numbness, N/V/D, blood in stool, or any other complaints.    Vital Signs: I have reviewed the initial vital signs.  Constitutional: Well-nourished, appears stated age, (+) moderate discomfort  HEENT: Airway patent, moist MM, no erythema/swelling/deformity of oral structures. EOMI, PERRLA.  CV: regular rate, regular rhythm, well-perfused extremities, 2+ b/l DP and radial pulses equal.  Lungs: BCTA, no increased WOB.  ABD: (+) tenderness to suprapubic region, no guarding or rebound, no pulsatile mass, no hernias.   MSK: Neck supple, nontender, nl ROM, no stepoff. Chest nontender. Back nontender in TLS spine or to b/l bony structures or flanks. Ext nontender, nl rom, no deformity.   INTEG: Skin warm, dry, no rash.  NEURO: A&Ox3, normal strength, nl sensation throughout, normal speech.   PSYCH: Calm, cooperative, normal affect and interaction.    Patient with pain from urinary retention. Mccormick placed at bedside which drained urine and patient with immediate relief of pain. Will obtain UA, urine cx, re-eval.

## 2021-08-21 NOTE — ED PROVIDER NOTE - NS ED ROS FT
Constitutional: no fever, chills, no recent weight loss, change in appetite or malaise  Eyes: no redness/discharge/pain/vision changes  ENT: no rhinorrhea/ear pain/sore throat  Cardiac: No chest pain, SOB or edema.  Respiratory: No cough or respiratory distress  GI: No nausea, vomiting, diarrhea or abdominal pain.  MS: no pain to back or extremities, no loss of ROM, no weakness  Neuro: No headache or weakness. No LOC.  Skin: No skin rash.  Except as documented in the HPI, all other systems are negative.

## 2021-08-21 NOTE — ED ADULT NURSE NOTE - OBJECTIVE STATEMENT
Pt c/o urinary retention and abdominal pain, x few days. Pt c/o urinary retention and abdominal pain, x few days. Pt has abdominal distention.

## 2021-08-21 NOTE — ED PROVIDER NOTE - PATIENT PORTAL LINK FT
You can access the FollowMyHealth Patient Portal offered by [facility] by registering at the following website: [email or web address]. By joining [employer]’s FollowMyHealth portal, you will also be able to view your health information using other applications (apps) compatible with our system.

## 2021-08-21 NOTE — ED ADULT TRIAGE NOTE - CHIEF COMPLAINT QUOTE
As per daughter "he has not been able to pee since last night and c/o increase lower abdominal pain and "pee-pee"

## 2021-08-21 NOTE — ED PROVIDER NOTE - PROGRESS NOTE DETAILS
Pt counseled - warning si/sxs d/w pt. Pt instructed to return to ed or f/u with PCP/urology should sxs persist/worsen. Pt verbalizes an understanding & agreement with the plan as discussed

## 2021-08-21 NOTE — ED PROVIDER NOTE - CARE PROVIDER_API CALL
George Serna)  Urology  [facility] [address] [address]  Phone: [phone]  Fax: [phone]  Follow Up Time:

## 2021-08-21 NOTE — ED PROVIDER NOTE - CLINICAL SUMMARY MEDICAL DECISION MAKING FREE TEXT BOX
Patient presented with acute urinary retention since last night. Otherwise afebrile, HD stable, neurovascularly intact, no trauma. Magallanes placed at bedside with successful drainage of urine and patient felt immediate relief. UA obtained and negative for infection - cx sent. Will leave magallanes in place and patient to follow up with urology as outpatient. Patient and daughter agreeable with plan. Agrees to return to ED for any new or worsening symptoms.

## 2021-08-23 LAB
CULTURE RESULTS: NO GROWTH — SIGNIFICANT CHANGE UP
SPECIMEN SOURCE: SIGNIFICANT CHANGE UP

## 2021-08-26 ENCOUNTER — INPATIENT (INPATIENT)
Facility: HOSPITAL | Age: 81
LOS: 2 days | Discharge: HOME | End: 2021-08-29
Payer: MEDICARE

## 2021-08-26 VITALS
RESPIRATION RATE: 19 BRPM | HEIGHT: 65 IN | WEIGHT: 160.06 LBS | HEART RATE: 109 BPM | DIASTOLIC BLOOD PRESSURE: 60 MMHG | OXYGEN SATURATION: 95 % | SYSTOLIC BLOOD PRESSURE: 135 MMHG | TEMPERATURE: 102 F

## 2021-08-26 DIAGNOSIS — Z79.82 LONG TERM (CURRENT) USE OF ASPIRIN: ICD-10-CM

## 2021-08-26 DIAGNOSIS — N40.1 BENIGN PROSTATIC HYPERPLASIA WITH LOWER URINARY TRACT SYMPTOMS: ICD-10-CM

## 2021-08-26 DIAGNOSIS — I10 ESSENTIAL (PRIMARY) HYPERTENSION: ICD-10-CM

## 2021-08-26 DIAGNOSIS — E11.40 TYPE 2 DIABETES MELLITUS WITH DIABETIC NEUROPATHY, UNSPECIFIED: ICD-10-CM

## 2021-08-26 DIAGNOSIS — N39.0 URINARY TRACT INFECTION, SITE NOT SPECIFIED: ICD-10-CM

## 2021-08-26 DIAGNOSIS — R33.8 OTHER RETENTION OF URINE: ICD-10-CM

## 2021-08-26 DIAGNOSIS — Z79.84 LONG TERM (CURRENT) USE OF ORAL HYPOGLYCEMIC DRUGS: ICD-10-CM

## 2021-08-26 DIAGNOSIS — A41.9 SEPSIS, UNSPECIFIED ORGANISM: ICD-10-CM

## 2021-08-26 DIAGNOSIS — N13.8 OTHER OBSTRUCTIVE AND REFLUX UROPATHY: ICD-10-CM

## 2021-08-26 DIAGNOSIS — Z85.46 PERSONAL HISTORY OF MALIGNANT NEOPLASM OF PROSTATE: ICD-10-CM

## 2021-08-26 DIAGNOSIS — Z79.02 LONG TERM (CURRENT) USE OF ANTITHROMBOTICS/ANTIPLATELETS: ICD-10-CM

## 2021-08-26 LAB
ALBUMIN SERPL ELPH-MCNC: 4.1 G/DL — SIGNIFICANT CHANGE UP (ref 3.5–5.2)
ALP SERPL-CCNC: 69 U/L — SIGNIFICANT CHANGE UP (ref 30–115)
ALT FLD-CCNC: 10 U/L — SIGNIFICANT CHANGE UP (ref 0–41)
ANION GAP SERPL CALC-SCNC: 11 MMOL/L — SIGNIFICANT CHANGE UP (ref 7–14)
APPEARANCE UR: ABNORMAL
AST SERPL-CCNC: 24 U/L — SIGNIFICANT CHANGE UP (ref 0–41)
BACTERIA # UR AUTO: ABNORMAL
BASE EXCESS BLDV CALC-SCNC: 4.4 MMOL/L — HIGH (ref -2–3)
BASOPHILS # BLD AUTO: 0.03 K/UL — SIGNIFICANT CHANGE UP (ref 0–0.2)
BASOPHILS NFR BLD AUTO: 0.2 % — SIGNIFICANT CHANGE UP (ref 0–1)
BILIRUB SERPL-MCNC: 0.5 MG/DL — SIGNIFICANT CHANGE UP (ref 0.2–1.2)
BILIRUB UR-MCNC: NEGATIVE — SIGNIFICANT CHANGE UP
BUN SERPL-MCNC: 25 MG/DL — HIGH (ref 10–20)
CA-I SERPL-SCNC: 1.24 MMOL/L — SIGNIFICANT CHANGE UP (ref 1.15–1.33)
CALCIUM SERPL-MCNC: 9.1 MG/DL — SIGNIFICANT CHANGE UP (ref 8.5–10.1)
CHLORIDE SERPL-SCNC: 95 MMOL/L — LOW (ref 98–110)
CO2 SERPL-SCNC: 25 MMOL/L — SIGNIFICANT CHANGE UP (ref 17–32)
COLOR SPEC: ABNORMAL
CREAT SERPL-MCNC: 1.2 MG/DL — SIGNIFICANT CHANGE UP (ref 0.7–1.5)
DIFF PNL FLD: ABNORMAL
EOSINOPHIL # BLD AUTO: 0.05 K/UL — SIGNIFICANT CHANGE UP (ref 0–0.7)
EOSINOPHIL NFR BLD AUTO: 0.4 % — SIGNIFICANT CHANGE UP (ref 0–8)
EPI CELLS # UR: 2 /HPF — SIGNIFICANT CHANGE UP (ref 0–5)
GAS PNL BLDV: 129 MMOL/L — LOW (ref 136–145)
GAS PNL BLDV: SIGNIFICANT CHANGE UP
GLUCOSE SERPL-MCNC: 323 MG/DL — HIGH (ref 70–99)
GLUCOSE UR QL: ABNORMAL
HCO3 BLDV-SCNC: 30 MMOL/L — HIGH (ref 22–29)
HCT VFR BLD CALC: 33.6 % — LOW (ref 42–52)
HCT VFR BLDA CALC: 45 % — SIGNIFICANT CHANGE UP (ref 39–51)
HGB BLD CALC-MCNC: 14.9 G/DL — SIGNIFICANT CHANGE UP (ref 12.6–17.4)
HGB BLD-MCNC: 11.5 G/DL — LOW (ref 14–18)
HYALINE CASTS # UR AUTO: 26 /LPF — HIGH (ref 0–7)
IMM GRANULOCYTES NFR BLD AUTO: 0.5 % — HIGH (ref 0.1–0.3)
KETONES UR-MCNC: SIGNIFICANT CHANGE UP
LACTATE BLDV-MCNC: 2.1 MMOL/L — HIGH (ref 0.5–2)
LACTATE SERPL-SCNC: 2.5 MMOL/L — HIGH (ref 0.7–2)
LEUKOCYTE ESTERASE UR-ACNC: ABNORMAL
LYMPHOCYTES # BLD AUTO: 0.56 K/UL — LOW (ref 1.2–3.4)
LYMPHOCYTES # BLD AUTO: 3.9 % — LOW (ref 20.5–51.1)
MCHC RBC-ENTMCNC: 29.4 PG — SIGNIFICANT CHANGE UP (ref 27–31)
MCHC RBC-ENTMCNC: 34.2 G/DL — SIGNIFICANT CHANGE UP (ref 32–37)
MCV RBC AUTO: 85.9 FL — SIGNIFICANT CHANGE UP (ref 80–94)
MONOCYTES # BLD AUTO: 0.78 K/UL — HIGH (ref 0.1–0.6)
MONOCYTES NFR BLD AUTO: 5.5 % — SIGNIFICANT CHANGE UP (ref 1.7–9.3)
NEUTROPHILS # BLD AUTO: 12.73 K/UL — HIGH (ref 1.4–6.5)
NEUTROPHILS NFR BLD AUTO: 89.5 % — HIGH (ref 42.2–75.2)
NITRITE UR-MCNC: NEGATIVE — SIGNIFICANT CHANGE UP
NRBC # BLD: 0 /100 WBCS — SIGNIFICANT CHANGE UP (ref 0–0)
PCO2 BLDV: 46 MMHG — SIGNIFICANT CHANGE UP (ref 42–55)
PH BLDV: 7.42 — SIGNIFICANT CHANGE UP (ref 7.32–7.43)
PH UR: 6 — SIGNIFICANT CHANGE UP (ref 5–8)
PLATELET # BLD AUTO: 183 K/UL — SIGNIFICANT CHANGE UP (ref 130–400)
PO2 BLDV: 38 MMHG — SIGNIFICANT CHANGE UP
POTASSIUM BLDV-SCNC: 4.4 MMOL/L — SIGNIFICANT CHANGE UP (ref 3.5–5.1)
POTASSIUM SERPL-MCNC: 4.5 MMOL/L — SIGNIFICANT CHANGE UP (ref 3.5–5)
POTASSIUM SERPL-SCNC: 4.5 MMOL/L — SIGNIFICANT CHANGE UP (ref 3.5–5)
PROT SERPL-MCNC: 6.3 G/DL — SIGNIFICANT CHANGE UP (ref 6–8)
PROT UR-MCNC: ABNORMAL
RBC # BLD: 3.91 M/UL — LOW (ref 4.7–6.1)
RBC # FLD: 12.2 % — SIGNIFICANT CHANGE UP (ref 11.5–14.5)
RBC CASTS # UR COMP ASSIST: >720 /HPF — HIGH (ref 0–4)
SAO2 % BLDV: 63.3 % — SIGNIFICANT CHANGE UP
SARS-COV-2 RNA SPEC QL NAA+PROBE: SIGNIFICANT CHANGE UP
SODIUM SERPL-SCNC: 131 MMOL/L — LOW (ref 135–146)
SP GR SPEC: 1.02 — SIGNIFICANT CHANGE UP (ref 1.01–1.03)
UROBILINOGEN FLD QL: SIGNIFICANT CHANGE UP
WBC # BLD: 14.22 K/UL — HIGH (ref 4.8–10.8)
WBC # FLD AUTO: 14.22 K/UL — HIGH (ref 4.8–10.8)
WBC UR QL: >720 /HPF — HIGH (ref 0–5)

## 2021-08-26 PROCEDURE — 71045 X-RAY EXAM CHEST 1 VIEW: CPT | Mod: 26

## 2021-08-26 PROCEDURE — 93010 ELECTROCARDIOGRAM REPORT: CPT

## 2021-08-26 PROCEDURE — 99285 EMERGENCY DEPT VISIT HI MDM: CPT

## 2021-08-26 RX ORDER — ACETAMINOPHEN 500 MG
975 TABLET ORAL ONCE
Refills: 0 | Status: COMPLETED | OUTPATIENT
Start: 2021-08-26 | End: 2021-08-26

## 2021-08-26 RX ORDER — SODIUM CHLORIDE 9 MG/ML
2000 INJECTION, SOLUTION INTRAVENOUS ONCE
Refills: 0 | Status: COMPLETED | OUTPATIENT
Start: 2021-08-26 | End: 2021-08-26

## 2021-08-26 RX ORDER — LIDOCAINE HCL 20 MG/ML
5 VIAL (ML) INJECTION ONCE
Refills: 0 | Status: COMPLETED | OUTPATIENT
Start: 2021-08-26 | End: 2021-08-26

## 2021-08-26 RX ORDER — CEFEPIME 1 G/1
1000 INJECTION, POWDER, FOR SOLUTION INTRAMUSCULAR; INTRAVENOUS ONCE
Refills: 0 | Status: COMPLETED | OUTPATIENT
Start: 2021-08-26 | End: 2021-08-26

## 2021-08-26 RX ADMIN — Medication 5 MILLILITER(S): at 22:01

## 2021-08-26 RX ADMIN — SODIUM CHLORIDE 2000 MILLILITER(S): 9 INJECTION, SOLUTION INTRAVENOUS at 19:26

## 2021-08-26 RX ADMIN — Medication 975 MILLIGRAM(S): at 19:52

## 2021-08-26 RX ADMIN — CEFEPIME 100 MILLIGRAM(S): 1 INJECTION, POWDER, FOR SOLUTION INTRAMUSCULAR; INTRAVENOUS at 19:47

## 2021-08-26 NOTE — ED PROVIDER NOTE - CLINICAL SUMMARY MEDICAL DECISION MAKING FREE TEXT BOX
Pt with multiple medical problems presented to ED for fever, weakness. Mccormick present PTA. Labs, imaging obtained. Pt with UTI. Abx given, fluid given. Repeat lactate ordered. Will admit for further eval. Endorsed to medical team.

## 2021-08-26 NOTE — ED ADULT NURSE NOTE - CHIEF COMPLAINT QUOTE
pt BIBA from home  pt recently had Magallanes placed, and was set to come out tomorrow urine in bag noted to be very dark pt c/o discomfort at magallanes site , w/ fever and weakness

## 2021-08-26 NOTE — ED ADULT NURSE NOTE - NSFALLRSKUNASSIST_ED_ALL_ED
A script for glucose strips was sent to [facility] on 7.29.17. Tried to call pharmacy to FU, no answer. Called twice. RN to FU. no

## 2021-08-26 NOTE — ED PROVIDER NOTE - ATTENDING CONTRIBUTION TO CARE
I personally evaluated the patient. I reviewed the Resident’s or Physician Assistant’s note (as assigned above), and agree with the findings and plan except as documented in my note.    Pt is a 80 y/o male with hx of HTN, DM, s/p magallanes insertion 5 days ago for urinary obstruction, presents for fever, weakness that started today. Mild constant. + dark urine. No abd pain, n/v/d, chest pain, cough, SOb.     Constitutional: Well developed, well nourished. NAD.  Head: Normocephalic, atraumatic.  Eyes: PERRL. EOMI.  ENT: No nasal discharge. Mucous membranes moist.  Neck: Supple. Painless ROM.  Cardiovascular: Normal S1, S2. Regular rate and rhythm. No murmurs, rubs, or gallops.  Pulmonary: Normal respiratory rate and effort. Lungs clear to auscultation bilaterally. No wheezing, rales, or rhonchi.  Abdominal: Soft. Nondistended. Nontender. No rebound, guarding, rigidity. Magallanes in place. Dark urine.  Extremities. Pelvis stable. No lower extremity edema, symmetric calves.  Skin: No rashes, cyanosis. Warm to touch.  Neuro: AAOx3. No focal neurological deficits.  Psych: Normal mood. Normal affect.

## 2021-08-26 NOTE — ED PROVIDER NOTE - PHYSICAL EXAMINATION
CONSTITUTIONAL: Well-developed; well-nourished; in no acute distress.   SKIN: warm, dry  HEAD: Normocephalic; atraumatic.  EYES: normal sclera and conjunctiva   ENT: No nasal discharge; airway clear.  NECK: Supple; non tender.  CARD: S1, S2 normal; no murmurs, gallops, or rubs. Regular rate and rhythm.   RESP: No wheezes, rales or rhonchi.  ABD: soft ntnd  : magallanes in place with dark urine in bag.   EXT: Normal ROM.  No clubbing, cyanosis or edema.   LYMPH: No acute cervical adenopathy.  NEURO: Alert, oriented, grossly unremarkable  PSYCH: Cooperative, appropriate.

## 2021-08-26 NOTE — ED PROVIDER NOTE - NS ED ROS FT
Eyes:  No visual changes, eye pain or discharge.  ENMT:  No hearing changes, pain, discharge or infections. No neck pain or stiffness.  Cardiac:  No chest pain, SOB or edema. No chest pain with exertion.  Respiratory:  No cough or respiratory distress. No hemoptysis.   GI:  No nausea, vomiting, diarrhea or abdominal pain.  :  No frequency or burning. +dysuria.   MS:  No myalgia, muscle weakness, joint pain or back pain.  Neuro:  No headache or weakness.  No LOC.  Skin:  No skin rash.   Endocrine: No history of thyroid disease +diabetes.

## 2021-08-26 NOTE — ED PROVIDER NOTE - OBJECTIVE STATEMENT
The patient is a 81 year old male with a history of HTN, DM s/p magallanes that was placed 5 days ago in [facility] ED for urinary obstruction presents today for fever and weakness. States urine was dark today. No nausea, vomiting, abdominal pain. Pt has upcoming appt with Dr. Maldonado.

## 2021-08-26 NOTE — ED ADULT NURSE NOTE - OBJECTIVE STATEMENT
Pt was brought for weakness and chills started today. Pt has a magallanes catheter inserted for a week.

## 2021-08-27 ENCOUNTER — APPOINTMENT (OUTPATIENT)
Dept: UROLOGY | Facility: CLINIC | Age: 81
End: 2021-08-27

## 2021-08-27 LAB
ALBUMIN SERPL ELPH-MCNC: 4 G/DL — SIGNIFICANT CHANGE UP (ref 3.5–5.2)
ALP SERPL-CCNC: 70 U/L — SIGNIFICANT CHANGE UP (ref 30–115)
ALT FLD-CCNC: 10 U/L — SIGNIFICANT CHANGE UP (ref 0–41)
ANION GAP SERPL CALC-SCNC: 13 MMOL/L — SIGNIFICANT CHANGE UP (ref 7–14)
AST SERPL-CCNC: 24 U/L — SIGNIFICANT CHANGE UP (ref 0–41)
BASOPHILS # BLD AUTO: 0.04 K/UL — SIGNIFICANT CHANGE UP (ref 0–0.2)
BASOPHILS NFR BLD AUTO: 0.2 % — SIGNIFICANT CHANGE UP (ref 0–1)
BILIRUB SERPL-MCNC: 0.7 MG/DL — SIGNIFICANT CHANGE UP (ref 0.2–1.2)
BUN SERPL-MCNC: 19 MG/DL — SIGNIFICANT CHANGE UP (ref 10–20)
CALCIUM SERPL-MCNC: 9.2 MG/DL — SIGNIFICANT CHANGE UP (ref 8.5–10.1)
CHLORIDE SERPL-SCNC: 94 MMOL/L — LOW (ref 98–110)
CO2 SERPL-SCNC: 23 MMOL/L — SIGNIFICANT CHANGE UP (ref 17–32)
COVID-19 SPIKE DOMAIN AB INTERP: POSITIVE
COVID-19 SPIKE DOMAIN ANTIBODY RESULT: >250 U/ML — HIGH
CREAT SERPL-MCNC: 1 MG/DL — SIGNIFICANT CHANGE UP (ref 0.7–1.5)
EOSINOPHIL # BLD AUTO: 0.14 K/UL — SIGNIFICANT CHANGE UP (ref 0–0.7)
EOSINOPHIL NFR BLD AUTO: 0.9 % — SIGNIFICANT CHANGE UP (ref 0–8)
GLUCOSE BLDC GLUCOMTR-MCNC: 104 MG/DL — HIGH (ref 70–99)
GLUCOSE BLDC GLUCOMTR-MCNC: 169 MG/DL — HIGH (ref 70–99)
GLUCOSE BLDC GLUCOMTR-MCNC: 170 MG/DL — HIGH (ref 70–99)
GLUCOSE BLDC GLUCOMTR-MCNC: 184 MG/DL — HIGH (ref 70–99)
GLUCOSE SERPL-MCNC: 166 MG/DL — HIGH (ref 70–99)
HCT VFR BLD CALC: 34 % — LOW (ref 42–52)
HGB BLD-MCNC: 11.6 G/DL — LOW (ref 14–18)
IMM GRANULOCYTES NFR BLD AUTO: 0.2 % — SIGNIFICANT CHANGE UP (ref 0.1–0.3)
LYMPHOCYTES # BLD AUTO: 1.89 K/UL — SIGNIFICANT CHANGE UP (ref 1.2–3.4)
LYMPHOCYTES # BLD AUTO: 11.7 % — LOW (ref 20.5–51.1)
MCHC RBC-ENTMCNC: 29.5 PG — SIGNIFICANT CHANGE UP (ref 27–31)
MCHC RBC-ENTMCNC: 34.1 G/DL — SIGNIFICANT CHANGE UP (ref 32–37)
MCV RBC AUTO: 86.5 FL — SIGNIFICANT CHANGE UP (ref 80–94)
MONOCYTES # BLD AUTO: 1.16 K/UL — HIGH (ref 0.1–0.6)
MONOCYTES NFR BLD AUTO: 7.2 % — SIGNIFICANT CHANGE UP (ref 1.7–9.3)
NEUTROPHILS # BLD AUTO: 12.84 K/UL — HIGH (ref 1.4–6.5)
NEUTROPHILS NFR BLD AUTO: 79.8 % — HIGH (ref 42.2–75.2)
NRBC # BLD: 0 /100 WBCS — SIGNIFICANT CHANGE UP (ref 0–0)
PLATELET # BLD AUTO: 244 K/UL — SIGNIFICANT CHANGE UP (ref 130–400)
POTASSIUM SERPL-MCNC: 4.3 MMOL/L — SIGNIFICANT CHANGE UP (ref 3.5–5)
POTASSIUM SERPL-SCNC: 4.3 MMOL/L — SIGNIFICANT CHANGE UP (ref 3.5–5)
PROT SERPL-MCNC: 6.3 G/DL — SIGNIFICANT CHANGE UP (ref 6–8)
RBC # BLD: 3.93 M/UL — LOW (ref 4.7–6.1)
RBC # FLD: 12 % — SIGNIFICANT CHANGE UP (ref 11.5–14.5)
SARS-COV-2 IGG+IGM SERPL QL IA: >250 U/ML — HIGH
SARS-COV-2 IGG+IGM SERPL QL IA: POSITIVE
SODIUM SERPL-SCNC: 130 MMOL/L — LOW (ref 135–146)
WBC # BLD: 16.11 K/UL — HIGH (ref 4.8–10.8)
WBC # FLD AUTO: 16.11 K/UL — HIGH (ref 4.8–10.8)

## 2021-08-27 PROCEDURE — 76775 US EXAM ABDO BACK WALL LIM: CPT | Mod: 26

## 2021-08-27 RX ORDER — INSULIN LISPRO 100/ML
VIAL (ML) SUBCUTANEOUS
Refills: 0 | Status: DISCONTINUED | OUTPATIENT
Start: 2021-08-27 | End: 2021-08-29

## 2021-08-27 RX ORDER — ONDANSETRON 8 MG/1
4 TABLET, FILM COATED ORAL EVERY 8 HOURS
Refills: 0 | Status: DISCONTINUED | OUTPATIENT
Start: 2021-08-27 | End: 2021-08-29

## 2021-08-27 RX ORDER — TAMSULOSIN HYDROCHLORIDE 0.4 MG/1
1 CAPSULE ORAL
Qty: 0 | Refills: 0 | DISCHARGE

## 2021-08-27 RX ORDER — TAMSULOSIN HYDROCHLORIDE 0.4 MG/1
0.4 CAPSULE ORAL AT BEDTIME
Refills: 0 | Status: DISCONTINUED | OUTPATIENT
Start: 2021-08-27 | End: 2021-08-29

## 2021-08-27 RX ORDER — BICALUTAMIDE 50 MG/1
50 TABLET, FILM COATED ORAL DAILY
Refills: 0 | Status: DISCONTINUED | OUTPATIENT
Start: 2021-08-27 | End: 2021-08-29

## 2021-08-27 RX ORDER — HYDROCHLOROTHIAZIDE 25 MG
25 TABLET ORAL DAILY
Refills: 0 | Status: DISCONTINUED | OUTPATIENT
Start: 2021-08-27 | End: 2021-08-29

## 2021-08-27 RX ORDER — LOSARTAN/HYDROCHLOROTHIAZIDE 100MG-25MG
1 TABLET ORAL
Qty: 0 | Refills: 0 | DISCHARGE

## 2021-08-27 RX ORDER — GABAPENTIN 400 MG/1
1 CAPSULE ORAL
Qty: 0 | Refills: 0 | DISCHARGE

## 2021-08-27 RX ORDER — LANOLIN ALCOHOL/MO/W.PET/CERES
3 CREAM (GRAM) TOPICAL AT BEDTIME
Refills: 0 | Status: DISCONTINUED | OUTPATIENT
Start: 2021-08-27 | End: 2021-08-29

## 2021-08-27 RX ORDER — METFORMIN HYDROCHLORIDE 850 MG/1
1 TABLET ORAL
Qty: 0 | Refills: 0 | DISCHARGE

## 2021-08-27 RX ORDER — CEFEPIME 1 G/1
2000 INJECTION, POWDER, FOR SOLUTION INTRAMUSCULAR; INTRAVENOUS EVERY 12 HOURS
Refills: 0 | Status: DISCONTINUED | OUTPATIENT
Start: 2021-08-27 | End: 2021-08-29

## 2021-08-27 RX ORDER — BICALUTAMIDE 50 MG/1
1 TABLET, FILM COATED ORAL
Qty: 0 | Refills: 0 | DISCHARGE

## 2021-08-27 RX ORDER — ATORVASTATIN CALCIUM 80 MG/1
20 TABLET, FILM COATED ORAL AT BEDTIME
Refills: 0 | Status: DISCONTINUED | OUTPATIENT
Start: 2021-08-27 | End: 2021-08-29

## 2021-08-27 RX ORDER — GABAPENTIN 400 MG/1
300 CAPSULE ORAL
Refills: 0 | Status: DISCONTINUED | OUTPATIENT
Start: 2021-08-27 | End: 2021-08-29

## 2021-08-27 RX ORDER — ENOXAPARIN SODIUM 100 MG/ML
40 INJECTION SUBCUTANEOUS DAILY
Refills: 0 | Status: DISCONTINUED | OUTPATIENT
Start: 2021-08-27 | End: 2021-08-29

## 2021-08-27 RX ORDER — PHENAZOPYRIDINE HCL 100 MG
100 TABLET ORAL EVERY 8 HOURS
Refills: 0 | Status: DISCONTINUED | OUTPATIENT
Start: 2021-08-27 | End: 2021-08-29

## 2021-08-27 RX ORDER — ACETAMINOPHEN 500 MG
650 TABLET ORAL EVERY 6 HOURS
Refills: 0 | Status: DISCONTINUED | OUTPATIENT
Start: 2021-08-27 | End: 2021-08-29

## 2021-08-27 RX ORDER — FINASTERIDE 5 MG/1
5 TABLET, FILM COATED ORAL DAILY
Refills: 0 | Status: DISCONTINUED | OUTPATIENT
Start: 2021-08-27 | End: 2021-08-29

## 2021-08-27 RX ORDER — ASPIRIN/CALCIUM CARB/MAGNESIUM 324 MG
81 TABLET ORAL DAILY
Refills: 0 | Status: DISCONTINUED | OUTPATIENT
Start: 2021-08-27 | End: 2021-08-29

## 2021-08-27 RX ORDER — LOSARTAN POTASSIUM 100 MG/1
100 TABLET, FILM COATED ORAL DAILY
Refills: 0 | Status: DISCONTINUED | OUTPATIENT
Start: 2021-08-27 | End: 2021-08-29

## 2021-08-27 RX ORDER — ATORVASTATIN CALCIUM 80 MG/1
1 TABLET, FILM COATED ORAL
Qty: 0 | Refills: 0 | DISCHARGE

## 2021-08-27 RX ADMIN — ATORVASTATIN CALCIUM 20 MILLIGRAM(S): 80 TABLET, FILM COATED ORAL at 20:41

## 2021-08-27 RX ADMIN — CEFEPIME 100 MILLIGRAM(S): 1 INJECTION, POWDER, FOR SOLUTION INTRAMUSCULAR; INTRAVENOUS at 17:15

## 2021-08-27 RX ADMIN — CEFEPIME 100 MILLIGRAM(S): 1 INJECTION, POWDER, FOR SOLUTION INTRAMUSCULAR; INTRAVENOUS at 06:50

## 2021-08-27 RX ADMIN — Medication 2: at 08:25

## 2021-08-27 RX ADMIN — LOSARTAN POTASSIUM 100 MILLIGRAM(S): 100 TABLET, FILM COATED ORAL at 06:51

## 2021-08-27 RX ADMIN — BICALUTAMIDE 50 MILLIGRAM(S): 50 TABLET, FILM COATED ORAL at 12:09

## 2021-08-27 RX ADMIN — Medication 81 MILLIGRAM(S): at 12:08

## 2021-08-27 RX ADMIN — ENOXAPARIN SODIUM 40 MILLIGRAM(S): 100 INJECTION SUBCUTANEOUS at 12:08

## 2021-08-27 RX ADMIN — GABAPENTIN 300 MILLIGRAM(S): 400 CAPSULE ORAL at 17:15

## 2021-08-27 RX ADMIN — Medication 100 MILLIGRAM(S): at 06:51

## 2021-08-27 RX ADMIN — FINASTERIDE 5 MILLIGRAM(S): 5 TABLET, FILM COATED ORAL at 17:15

## 2021-08-27 RX ADMIN — Medication 25 MILLIGRAM(S): at 06:50

## 2021-08-27 RX ADMIN — GABAPENTIN 300 MILLIGRAM(S): 400 CAPSULE ORAL at 06:50

## 2021-08-27 RX ADMIN — TAMSULOSIN HYDROCHLORIDE 0.4 MILLIGRAM(S): 0.4 CAPSULE ORAL at 20:41

## 2021-08-27 RX ADMIN — Medication 2: at 12:08

## 2021-08-27 NOTE — CONSULT NOTE ADULT - SUBJECTIVE AND OBJECTIVE BOX
SEVEROINES  81y, Male  Allergy: No Known Allergies      All historical available data reviewed.    HPI:  Patient is a 81 year old male with a history of HTN, DM s/p magallanes that was placed 5 days ago in [facility] ED for urinary obstruction presents today for fever and weakness. States urine was dark today. No nausea, vomiting, abdominal pain. Pt has upcoming appt with Dr. Maldonado.     In ED vitals: /60, , RR 19, T 101.9, SpO2 95% on RA    Labs significant for WBC 14K, lactate 2.5  (27 Aug 2021 01:10)        FAMILY HISTORY:    PAST MEDICAL & SURGICAL HISTORY:  Diabetes    HTN (hypertension)    Neuropathy    No significant past surgical history          VITALS:  T(F): 97.6, Max: 101.9 (21 @ 18:18)  HR: 85  BP: 163/69  RR: 17Vital Signs Last 24 Hrs  T(C): 36.4 (27 Aug 2021 05:00), Max: 38.8 (26 Aug 2021 18:18)  T(F): 97.6 (27 Aug 2021 05:00), Max: 101.9 (26 Aug 2021 18:18)  HR: 85 (27 Aug 2021 05:00) (75 - 109)  BP: 163/69 (27 Aug 2021 05:00) (127/81 - 163/69)  BP(mean): 98 (27 Aug 2021 00:43) (98 - 98)  RR: 17 (27 Aug 2021 05:00) (16 - 19)  SpO2: 98% (27 Aug 2021 00:43) (95% - 98%)    TESTS & MEASUREMENTS:                        11.5   14.22 )-----------( 183      ( 26 Aug 2021 19:17 )             33.6     08-    131<L>  |  95<L>  |  25<H>  ----------------------------<  323<H>  4.5   |  25  |  1.2    Ca    9.1      26 Aug 2021 19:17    TPro  6.3  /  Alb  4.1  /  TBili  0.5  /  DBili  x   /  AST  24  /  ALT  10  /  AlkPhos  69      LIVER FUNCTIONS - ( 26 Aug 2021 19:17 )  Alb: 4.1 g/dL / Pro: 6.3 g/dL / ALK PHOS: 69 U/L / ALT: 10 U/L / AST: 24 U/L / GGT: x             Culture - Urine (collected 21 @ 16:58)  Source: Clean Catch Clean Catch (Midstream)  Final Report (21 @ 07:17):    No growth      Urinalysis Basic - ( 26 Aug 2021 19:35 )    Color: Light Orange / Appearance: Turbid / S.023 / pH: x  Gluc: x / Ketone: Trace  / Bili: Negative / Urobili: <2 mg/dL   Blood: x / Protein: 100 mg/dL / Nitrite: Negative   Leuk Esterase: Large / RBC: >720 /HPF / WBC >720 /HPF   Sq Epi: x / Non Sq Epi: 2 /HPF / Bacteria: Many          RADIOLOGY & ADDITIONAL TESTS:  Personal review of radiological diagnostics performed  Echo and EKG results noted when applicable.     MEDICATIONS:  acetaminophen   Tablet .. 650 milliGRAM(s) Oral every 6 hours PRN  aluminum hydroxide/magnesium hydroxide/simethicone Suspension 30 milliLiter(s) Oral every 4 hours PRN  aspirin  chewable 81 milliGRAM(s) Oral daily  atorvastatin 20 milliGRAM(s) Oral at bedtime  bicalutamide 50 milliGRAM(s) Oral daily  cefepime   IVPB 2000 milliGRAM(s) IV Intermittent every 12 hours  enoxaparin Injectable 40 milliGRAM(s) SubCutaneous daily  gabapentin 300 milliGRAM(s) Oral two times a day  hydrochlorothiazide 25 milliGRAM(s) Oral daily  insulin lispro (ADMELOG) corrective regimen sliding scale   SubCutaneous three times a day before meals  losartan 100 milliGRAM(s) Oral daily  melatonin 3 milliGRAM(s) Oral at bedtime PRN  ondansetron Injectable 4 milliGRAM(s) IV Push every 8 hours PRN  phenazopyridine 100 milliGRAM(s) Oral every 8 hours PRN  tamsulosin 0.4 milliGRAM(s) Oral at bedtime      ANTIBIOTICS:  cefepime   IVPB 2000 milliGRAM(s) IV Intermittent every 12 hours

## 2021-08-27 NOTE — PROGRESS NOTE ADULT - SUBJECTIVE AND OBJECTIVE BOX
Name: INES ELKINS  Age: 81y  Gender: Male    Pt was seen and examined.   c/o:  unable to urinate    Allergies:  No Known Allergies      PHYSICAL EXAM:    Vitals:  T(C): 36.9 (08-27-21 @ 20:09), Max: 36.9 (08-27-21 @ 20:09)  HR: 83 (08-27-21 @ 20:09) (75 - 88)  BP: 141/75 (08-27-21 @ 20:09) (126/61 - 163/69)  RR: 18 (08-27-21 @ 20:09) (16 - 18)  SpO2: 98% (08-27-21 @ 00:43) (98% - 98%)  Wt(kg): --Vital Signs Last 24 Hrs  T(C): 36.9 (27 Aug 2021 20:09), Max: 36.9 (27 Aug 2021 20:09)  T(F): 98.5 (27 Aug 2021 20:09), Max: 98.5 (27 Aug 2021 20:09)  HR: 83 (27 Aug 2021 20:09) (75 - 88)  BP: 141/75 (27 Aug 2021 20:09) (126/61 - 163/69)  BP(mean): 98 (27 Aug 2021 00:43) (98 - 98)  RR: 18 (27 Aug 2021 20:09) (16 - 18)  SpO2: 98% (27 Aug 2021 00:43) (98% - 98%)      NECK: Supple, No JVD  CHEST/LUNG: CTA, B/L, No rales, rhonchi, wheezing, or rubs  HEART: S1,S2, N1 Regular rate and rhythm; No murmurs, rubs, or gallops  ABDOMEN: Soft, Nontender, Nondistended; Bowel sounds present  EXTREMITIES:  2+ Peripheral Pulses, No clubbing, cyanosis, or edema      LABS:                        11.6   16.11 )-----------( 244      ( 27 Aug 2021 12:05 )             34.0     08-27    130<L>  |  94<L>  |  19  ----------------------------<  166<H>  4.3   |  23  |  1.0    Ca    9.2      27 Aug 2021 12:05    TPro  6.3  /  Alb  4.0  /  TBili  0.7  /  DBili  x   /  AST  24  /  ALT  10  /  AlkPhos  70  08-27    LIVER FUNCTIONS - ( 27 Aug 2021 12:05 )  Alb: 4.0 g/dL / Pro: 6.3 g/dL / ALK PHOS: 70 U/L / ALT: 10 U/L / AST: 24 U/L / GGT: x                 MEDICATIONS  (STANDING):  aspirin  chewable 81 milliGRAM(s) Oral daily  atorvastatin 20 milliGRAM(s) Oral at bedtime  bicalutamide 50 milliGRAM(s) Oral daily  cefepime   IVPB 2000 milliGRAM(s) IV Intermittent every 12 hours  enoxaparin Injectable 40 milliGRAM(s) SubCutaneous daily  finasteride 5 milliGRAM(s) Oral daily  gabapentin 300 milliGRAM(s) Oral two times a day  hydrochlorothiazide 25 milliGRAM(s) Oral daily  insulin lispro (ADMELOG) corrective regimen sliding scale   SubCutaneous three times a day before meals  losartan 100 milliGRAM(s) Oral daily  tamsulosin 0.4 milliGRAM(s) Oral at bedtime        RADIOLOGY & ADDITIONAL TESTS:    Imaging Personally Reviewed:  [ ] YES  [ ] NO    A/P:  Patient is a 81 year old male with a history of HTN, DM s/p magallanes that was placed 5 days ago in [facility] ED for urinary obstruction presents today for fever and weakness.     #Urosepsis 2/2 urinary retention   #H/o BPH   - Septic on admission: , T 101.9, WBC 14K, lactate 2.5   - UA pos: large LE, neg nitrite, many bacteria, WBC > 720, RBC > 720 -> f/u urine culture   - s/p 2L IVF and 1 dose cefepime in ED -> f/u ID recs   - s/p magallanes insertion in ED 5 days ago   - renal US   - continue tamsulosin but increase to 0.8mg po qhs  added  Finasteride 5mg po q24 earlier today    Prostate CA - Casodex, hormone therapy per family and pt    #DM - f/u a1c, ISS,   #Neuropathy - cont gabapentin 300mg bid   #HTN - cont losartan 100, hctz 25 qd   #Prostate CA - cont bicalutamide 50mg     #Misc   - DVT ppx: lovenox   - GI ppx: none  - Diet: CC  - Activity IAT   Full Code     d/w pts dtr at length.  TOV trial on Sunday in AM   and decide re Jace

## 2021-08-27 NOTE — CONSULT NOTE ADULT - ASSESSMENT
81 year old male with a history of HTN, DM s/p magallanes that was placed 5 days ago in [facility] ED for urinary obstruction presents today for fever and weakness. States urine was dark today. No nausea, vomiting, abdominal pain.     IMPRESSION;  Will treat as a ascending infection  Presently with magallanes catheter  WBC 14.2  8/21 UCx NG    RECOMMENDATIONS;  Rocephin 2 gm iv q24h  D/c on po vantin 200 mg q12h till 9/10  F/u with   recall prn please

## 2021-08-27 NOTE — H&P ADULT - ASSESSMENT
Patient is a 81 year old male with a history of HTN, DM s/p magallanes that was placed 5 days ago in [facility] ED for urinary obstruction presents today for fever and weakness.     #Urosepsis   - Septic on admission: , T 101.9, WBC 14K, lactate 2.5   - UA pos: large LE, neg nitrite, many bacteria, WBC > 720, RBC > 720   - s/p 2L IVF and 1 dose cefepime in ED    Patient is a 81 year old male with a history of HTN, DM s/p magallanes that was placed 5 days ago in [facility] ED for urinary obstruction presents today for fever and weakness.     #Urosepsis   - Septic on admission: , T 101.9, WBC 14K, lactate 2.5   - UA pos: large LE, neg nitrite, many bacteria, WBC > 720, RBC > 720   - s/p 2L IVF and 1 dose cefepime in ED -> f/u ID recs      Patient is a 81 year old male with a history of HTN, DM s/p magallanes that was placed 5 days ago in [facility] ED for urinary obstruction presents today for fever and weakness.     #Urosepsis 2/2 urinary retention   #H/o BPH   - Septic on admission: , T 101.9, WBC 14K, lactate 2.5   - UA pos: large LE, neg nitrite, many bacteria, WBC > 720, RBC > 720 -> f/u urine culture   - s/p 2L IVF and 1 dose cefepime in ED -> f/u ID recs   - s/p magallanes insertion in ED 5 days ago   - renal US   - continue tamsulosin 0.4 qhs     #DM - f/u a1c, ISS,   #HTN - cont losartan 100, hctz 25 qd   #Prostate CA - cont bicalutamide 50mg     #Misc   - DVT ppx: lovenox   - GI ppx: none  - Diet: CC  - Activity IAT   Full Code          Patient is a 81 year old male with a history of HTN, DM s/p magallanes that was placed 5 days ago in [facility] ED for urinary obstruction presents today for fever and weakness.     #Urosepsis 2/2 urinary retention   #H/o BPH   - Septic on admission: , T 101.9, WBC 14K, lactate 2.5   - UA pos: large LE, neg nitrite, many bacteria, WBC > 720, RBC > 720 -> f/u urine culture   - s/p 2L IVF and 1 dose cefepime in ED -> f/u ID recs   - s/p magallanes insertion in ED 5 days ago   - renal US   - continue tamsulosin 0.4 qhs     #DM - f/u a1c, ISS,   #Neuropathy - cont gabapentin 300mg bid   #HTN - cont losartan 100, hctz 25 qd   #Prostate CA - cont bicalutamide 50mg     #Misc   - DVT ppx: lovenox   - GI ppx: none  - Diet: CC  - Activity IAT   Full Code          Patient is a 81 year old male with a history of HTN, DM s/p magallanes that was placed 5 days ago in [facility] ED for urinary obstruction presents today for fever and weakness.     #Urosepsis 2/2 urinary retention   #H/o BPH   - Septic on admission: , T 101.9, WBC 14K, lactate 2.5   - UA pos: large LE, neg nitrite, many bacteria, WBC > 720, RBC > 720 -> f/u urine culture   - s/p 2L IVF and 1 dose cefepime in ED -> f/u ID recs   - s/p magallanes insertion in ED 5 days ago   - renal US   - continue tamsulosin but increase to 0.8mg po qhs  add Finasteride 5mg po q24    Prostate CA - Casodex    #DM - f/u a1c, ISS,   #Neuropathy - cont gabapentin 300mg bid   #HTN - cont losartan 100, hctz 25 qd   #Prostate CA - cont bicalutamide 50mg     #Misc   - DVT ppx: lovenox   - GI ppx: none  - Diet: CC  - Activity IAT   Full Code     d/w pts dtr at length.  TOV trial on Sunday in AM

## 2021-08-27 NOTE — H&P ADULT - NSHPLABSRESULTS_GEN_ALL_CORE
11.5   14.22 )-----------( 183      ( 26 Aug 2021 19:17 )             33.6           131<L>  |  95<L>  |  25<H>  ----------------------------<  323<H>  4.5   |  25  |  1.2    Ca    9.1      26 Aug 2021 19:17    TPro  6.3  /  Alb  4.1  /  TBili  0.5  /  DBili  x   /  AST  24  /  ALT  10  /  AlkPhos  69        `  Urinalysis Basic - ( 26 Aug 2021 19:35 )  Color: Light Orange / Appearance: Turbid / S.023 / pH: x  Gluc: x / Ketone: Trace  / Bili: Negative / Urobili: <2 mg/dL   Blood: x / Protein: 100 mg/dL / Nitrite: Negative   Leuk Esterase: Large / RBC: >720 /HPF / WBC >720 /HPF   Sq Epi: x / Non Sq Epi: 2 /HPF / Bacteria: Many    Lactate Trend   @ 19:17 Lactate:2.5     CAPILLARY BLOOD GLUCOSE    Culture Results:   No growth ( @ 16:58)

## 2021-08-27 NOTE — H&P ADULT - HISTORY OF PRESENT ILLNESS
Patient is a 81 year old male with a history of HTN, DM s/p magallanes that was placed 5 days ago in [facility] ED for urinary obstruction presents today for fever and weakness. States urine was dark today. No nausea, vomiting, abdominal pain. Pt has upcoming appt with Dr. Maldonado.     In ED vitals: /60, , RR 19, T 101.9, SpO2 95% on RA   Lav Patient is a 81 year old male with a history of HTN, DM s/p magallanes that was placed 5 days ago in [facility] ED for urinary obstruction presents today for fever and weakness. States urine was dark today. No nausea, vomiting, abdominal pain. Pt has upcoming appt with Dr. Maldonado.     In ED vitals: /60, , RR 19, T 101.9, SpO2 95% on RA    Labs significant for WBC 14K, lactate 2.5  Patient is a 81 year old male with a history of HTN, DM s/p magallanes that was placed 5 days ago in [facility] ED for urinary obstruction presents today for fever and weakness. States urine was dark today. No nausea, vomiting, abdominal pain. Pt has upcoming appt with Dr. Maldonado for his prostate cancer.     In ED vitals: /60, , RR 19, T 101.9, SpO2 95% on RA    Labs significant for WBC 14K, lactate 2.5

## 2021-08-27 NOTE — H&P ADULT - NSHPPHYSICALEXAM_GEN_ALL_CORE
Vital Signs Last 24 Hrs  T(C): 36.6 (27 Aug 2021 00:43), Max: 38.8 (26 Aug 2021 18:18)  T(F): 97.8 (27 Aug 2021 00:43), Max: 101.9 (26 Aug 2021 18:18)  HR: 75 (27 Aug 2021 00:43) (75 - 109)  BP: 154/68 (27 Aug 2021 00:43) (127/81 - 154/68)  BP(mean): 98 (27 Aug 2021 00:43) (98 - 98)  RR: 16 (27 Aug 2021 00:43) (16 - 19)  SpO2: 98% (27 Aug 2021 00:43) (95% - 98%)    GENERAL: NAD, lying in bed comfortably  HEAD:  Atraumatic, Normocephalic  EYES: EOMI, conjunctiva and sclera clear  ENT: Moist mucous membranes  NECK: Supple, No JVD  CHEST/LUNG: Clear to auscultation bilaterally. Unlabored respirations  HEART: Regular rate and rhythm; No murmurs, rubs, or gallops  ABDOMEN: Bowel sounds present; Soft, Nontender, Nondistended.   EXTREMITIES: No clubbing, cyanosis, or edema  NERVOUS SYSTEM:  Alert & Oriented X3, speech clear. No deficits   SKIN: No rashes or lesions

## 2021-08-28 LAB
A1C WITH ESTIMATED AVERAGE GLUCOSE RESULT: 6.3 % — HIGH (ref 4–5.6)
ALBUMIN SERPL ELPH-MCNC: 3.8 G/DL — SIGNIFICANT CHANGE UP (ref 3.5–5.2)
ALP SERPL-CCNC: 60 U/L — SIGNIFICANT CHANGE UP (ref 30–115)
ALT FLD-CCNC: 11 U/L — SIGNIFICANT CHANGE UP (ref 0–41)
ANION GAP SERPL CALC-SCNC: 10 MMOL/L — SIGNIFICANT CHANGE UP (ref 7–14)
AST SERPL-CCNC: 18 U/L — SIGNIFICANT CHANGE UP (ref 0–41)
BASOPHILS # BLD AUTO: 0.04 K/UL — SIGNIFICANT CHANGE UP (ref 0–0.2)
BASOPHILS NFR BLD AUTO: 0.5 % — SIGNIFICANT CHANGE UP (ref 0–1)
BILIRUB SERPL-MCNC: 0.9 MG/DL — SIGNIFICANT CHANGE UP (ref 0.2–1.2)
BUN SERPL-MCNC: 19 MG/DL — SIGNIFICANT CHANGE UP (ref 10–20)
CALCIUM SERPL-MCNC: 8.9 MG/DL — SIGNIFICANT CHANGE UP (ref 8.5–10.1)
CHLORIDE SERPL-SCNC: 97 MMOL/L — LOW (ref 98–110)
CHOLEST SERPL-MCNC: 130 MG/DL — SIGNIFICANT CHANGE UP
CO2 SERPL-SCNC: 27 MMOL/L — SIGNIFICANT CHANGE UP (ref 17–32)
CREAT SERPL-MCNC: 1.2 MG/DL — SIGNIFICANT CHANGE UP (ref 0.7–1.5)
EOSINOPHIL # BLD AUTO: 0.23 K/UL — SIGNIFICANT CHANGE UP (ref 0–0.7)
EOSINOPHIL NFR BLD AUTO: 2.7 % — SIGNIFICANT CHANGE UP (ref 0–8)
ESTIMATED AVERAGE GLUCOSE: 134 MG/DL — HIGH (ref 68–114)
GLUCOSE BLDC GLUCOMTR-MCNC: 118 MG/DL — HIGH (ref 70–99)
GLUCOSE BLDC GLUCOMTR-MCNC: 140 MG/DL — HIGH (ref 70–99)
GLUCOSE BLDC GLUCOMTR-MCNC: 226 MG/DL — HIGH (ref 70–99)
GLUCOSE BLDC GLUCOMTR-MCNC: 263 MG/DL — HIGH (ref 70–99)
GLUCOSE SERPL-MCNC: 138 MG/DL — HIGH (ref 70–99)
HCT VFR BLD CALC: 31.8 % — LOW (ref 42–52)
HDLC SERPL-MCNC: 44 MG/DL — SIGNIFICANT CHANGE UP
HGB BLD-MCNC: 10.8 G/DL — LOW (ref 14–18)
IMM GRANULOCYTES NFR BLD AUTO: 0.2 % — SIGNIFICANT CHANGE UP (ref 0.1–0.3)
LIPID PNL WITH DIRECT LDL SERPL: 69 MG/DL — SIGNIFICANT CHANGE UP
LYMPHOCYTES # BLD AUTO: 1.1 K/UL — LOW (ref 1.2–3.4)
LYMPHOCYTES # BLD AUTO: 12.9 % — LOW (ref 20.5–51.1)
MAGNESIUM SERPL-MCNC: 1.9 MG/DL — SIGNIFICANT CHANGE UP (ref 1.8–2.4)
MCHC RBC-ENTMCNC: 29 PG — SIGNIFICANT CHANGE UP (ref 27–31)
MCHC RBC-ENTMCNC: 34 G/DL — SIGNIFICANT CHANGE UP (ref 32–37)
MCV RBC AUTO: 85.3 FL — SIGNIFICANT CHANGE UP (ref 80–94)
MONOCYTES # BLD AUTO: 0.67 K/UL — HIGH (ref 0.1–0.6)
MONOCYTES NFR BLD AUTO: 7.8 % — SIGNIFICANT CHANGE UP (ref 1.7–9.3)
NEUTROPHILS # BLD AUTO: 6.48 K/UL — SIGNIFICANT CHANGE UP (ref 1.4–6.5)
NEUTROPHILS NFR BLD AUTO: 75.9 % — HIGH (ref 42.2–75.2)
NON HDL CHOLESTEROL: 86 MG/DL — SIGNIFICANT CHANGE UP
NRBC # BLD: 0 /100 WBCS — SIGNIFICANT CHANGE UP (ref 0–0)
PLATELET # BLD AUTO: 221 K/UL — SIGNIFICANT CHANGE UP (ref 130–400)
POTASSIUM SERPL-MCNC: 4.2 MMOL/L — SIGNIFICANT CHANGE UP (ref 3.5–5)
POTASSIUM SERPL-SCNC: 4.2 MMOL/L — SIGNIFICANT CHANGE UP (ref 3.5–5)
PROT SERPL-MCNC: 6 G/DL — SIGNIFICANT CHANGE UP (ref 6–8)
RBC # BLD: 3.73 M/UL — LOW (ref 4.7–6.1)
RBC # FLD: 12.1 % — SIGNIFICANT CHANGE UP (ref 11.5–14.5)
SODIUM SERPL-SCNC: 134 MMOL/L — LOW (ref 135–146)
TRIGL SERPL-MCNC: 90 MG/DL — SIGNIFICANT CHANGE UP
WBC # BLD: 8.54 K/UL — SIGNIFICANT CHANGE UP (ref 4.8–10.8)
WBC # FLD AUTO: 8.54 K/UL — SIGNIFICANT CHANGE UP (ref 4.8–10.8)

## 2021-08-28 RX ORDER — INSULIN LISPRO 100/ML
4 VIAL (ML) SUBCUTANEOUS ONCE
Refills: 0 | Status: COMPLETED | OUTPATIENT
Start: 2021-08-28 | End: 2021-08-28

## 2021-08-28 RX ADMIN — LOSARTAN POTASSIUM 100 MILLIGRAM(S): 100 TABLET, FILM COATED ORAL at 06:00

## 2021-08-28 RX ADMIN — CEFEPIME 100 MILLIGRAM(S): 1 INJECTION, POWDER, FOR SOLUTION INTRAMUSCULAR; INTRAVENOUS at 05:59

## 2021-08-28 RX ADMIN — ATORVASTATIN CALCIUM 20 MILLIGRAM(S): 80 TABLET, FILM COATED ORAL at 20:53

## 2021-08-28 RX ADMIN — FINASTERIDE 5 MILLIGRAM(S): 5 TABLET, FILM COATED ORAL at 11:57

## 2021-08-28 RX ADMIN — TAMSULOSIN HYDROCHLORIDE 0.4 MILLIGRAM(S): 0.4 CAPSULE ORAL at 20:53

## 2021-08-28 RX ADMIN — BICALUTAMIDE 50 MILLIGRAM(S): 50 TABLET, FILM COATED ORAL at 17:12

## 2021-08-28 RX ADMIN — Medication 4: at 11:55

## 2021-08-28 RX ADMIN — ENOXAPARIN SODIUM 40 MILLIGRAM(S): 100 INJECTION SUBCUTANEOUS at 11:56

## 2021-08-28 RX ADMIN — GABAPENTIN 300 MILLIGRAM(S): 400 CAPSULE ORAL at 06:00

## 2021-08-28 RX ADMIN — GABAPENTIN 300 MILLIGRAM(S): 400 CAPSULE ORAL at 17:12

## 2021-08-28 RX ADMIN — Medication 4 UNIT(S): at 21:08

## 2021-08-28 RX ADMIN — Medication 25 MILLIGRAM(S): at 06:00

## 2021-08-28 RX ADMIN — Medication 81 MILLIGRAM(S): at 11:57

## 2021-08-28 RX ADMIN — CEFEPIME 100 MILLIGRAM(S): 1 INJECTION, POWDER, FOR SOLUTION INTRAMUSCULAR; INTRAVENOUS at 17:13

## 2021-08-28 NOTE — PROGRESS NOTE ADULT - ATTENDING COMMENTS
pt seen and examined  doing better today  d/w PA at length re plan of care  will give trial of TOV in am and if successful will d/c home  if not then will d/c with elpidio

## 2021-08-28 NOTE — PROGRESS NOTE ADULT - ASSESSMENT
81 year old male with a history of HTN, DM s/p magallanes that was placed 5 days ago in [facility] ED for urinary obstruction presents today for fever and weakness.     #Urosepsis 2/2 urinary retention   #H/o BPH   - Septic on admission: , T 101.9, WBC 14K, lactate 2.5   - UA pos: large LE, neg nitrite, many bacteria, WBC > 720, RBC > 720 ->   - urine culture : GNR  - ID consult reviewed  - s/p magallanes insertion in ED 5 days ago   - renal US : bph , no hydro  - continue tamsulosin   - added  Finasteride 5mg po q24   - TOV magallanes in am 8/29/21    # Prostate CA - Casodex, hormone therapy per family and pt    #DM - a1c: 6.3  , ISS,   #Neuropathy - cont gabapentin 300mg bid   #HTN - cont losartan 100, hctz 25 qd   #Prostate CA - cont bicalutamide 50mg     #Misc   - DVT ppx: lovenox   - GI ppx: none  - Diet: CC  - Activity IAT   Full Code     d/w pts dtr at length at bed side    d/w dr. keith

## 2021-08-28 NOTE — PROGRESS NOTE ADULT - SUBJECTIVE AND OBJECTIVE BOX
Name: INES ELKINS  Age: 81y  Gender: Male    Pt was seen and examined.   tolerated diet this am , afebrile   no new complaints , denies cp/ sob / fever/ chills     Allergies:  No Known Allergies    PHYSICAL EXAM:    Vital Signs Last 24 Hrs  T(C): 36.9 (28 Aug 2021 06:29), Max: 36.9 (27 Aug 2021 20:09)  T(F): 98.4 (28 Aug 2021 06:29), Max: 98.5 (27 Aug 2021 20:09)  HR: 78 (28 Aug 2021 06:29) (78 - 88)  BP: 124/60 (28 Aug 2021 06:29) (124/60 - 141/75)  BP(mean): --  RR: 18 (28 Aug 2021 06:29) (18 - 18)      NECK: Supple, No JVD  CHEST/LUNG: CTA, B/L, No rales, rhonchi, wheezing, or rubs  HEART: S1,S2, N1 Regular rate and rhythm; No murmurs, rubs, or gallops  ABDOMEN: Soft, Nontender, Nondistended; Bowel sounds present  EXTREMITIES:  2+ Peripheral Pulses, No clubbing, cyanosis, or edema  : magallanes in place with clear urine in cath     08-28    134<L>  |  97<L>  |  19  ----------------------------<  138<H>  4.2   |  27  |  1.2    Ca    8.9      28 Aug 2021 08:02  Mg     1.9     08-28    TPro  6.0  /  Alb  3.8  /  TBili  0.9  /  DBili  x   /  AST  18  /  ALT  11  /  AlkPhos  60  08-28                            10.8   8.54  )-----------( 221      ( 28 Aug 2021 08:02 )             31.8     CAPILLARY BLOOD GLUCOSE      POCT Blood Glucose.: 226 mg/dL (28 Aug 2021 11:37)  POCT Blood Glucose.: 140 mg/dL (28 Aug 2021 07:48)  POCT Blood Glucose.: 170 mg/dL (27 Aug 2021 20:41)  POCT Blood Glucose.: 104 mg/dL (27 Aug 2021 16:42)                MEDICATIONS  (STANDING):  aspirin  chewable 81 milliGRAM(s) Oral daily  atorvastatin 20 milliGRAM(s) Oral at bedtime  bicalutamide 50 milliGRAM(s) Oral daily  cefepime   IVPB 2000 milliGRAM(s) IV Intermittent every 12 hours  enoxaparin Injectable 40 milliGRAM(s) SubCutaneous daily  finasteride 5 milliGRAM(s) Oral daily  gabapentin 300 milliGRAM(s) Oral two times a day  hydrochlorothiazide 25 milliGRAM(s) Oral daily  insulin lispro (ADMELOG) corrective regimen sliding scale   SubCutaneous three times a day before meals  losartan 100 milliGRAM(s) Oral daily  tamsulosin 0.4 milliGRAM(s) Oral at bedtime            rb us:    IMPRESSION:    Simple cyst within the left kidney. Otherwise normal kidneys.    Enlarged prostate gland. Magallanes catheter within the urinary bladder.

## 2021-08-29 ENCOUNTER — TRANSCRIPTION ENCOUNTER (OUTPATIENT)
Age: 81
End: 2021-08-29

## 2021-08-29 VITALS
TEMPERATURE: 98 F | DIASTOLIC BLOOD PRESSURE: 70 MMHG | HEART RATE: 83 BPM | SYSTOLIC BLOOD PRESSURE: 128 MMHG | RESPIRATION RATE: 18 BRPM

## 2021-08-29 LAB
ANION GAP SERPL CALC-SCNC: 9 MMOL/L — SIGNIFICANT CHANGE UP (ref 7–14)
BUN SERPL-MCNC: 24 MG/DL — HIGH (ref 10–20)
CALCIUM SERPL-MCNC: 8.8 MG/DL — SIGNIFICANT CHANGE UP (ref 8.5–10.1)
CHLORIDE SERPL-SCNC: 95 MMOL/L — LOW (ref 98–110)
CO2 SERPL-SCNC: 28 MMOL/L — SIGNIFICANT CHANGE UP (ref 17–32)
CREAT SERPL-MCNC: 1.3 MG/DL — SIGNIFICANT CHANGE UP (ref 0.7–1.5)
GLUCOSE BLDC GLUCOMTR-MCNC: 148 MG/DL — HIGH (ref 70–99)
GLUCOSE BLDC GLUCOMTR-MCNC: 216 MG/DL — HIGH (ref 70–99)
GLUCOSE SERPL-MCNC: 141 MG/DL — HIGH (ref 70–99)
HCT VFR BLD CALC: 32.6 % — LOW (ref 42–52)
HGB BLD-MCNC: 10.9 G/DL — LOW (ref 14–18)
MCHC RBC-ENTMCNC: 28.9 PG — SIGNIFICANT CHANGE UP (ref 27–31)
MCHC RBC-ENTMCNC: 33.4 G/DL — SIGNIFICANT CHANGE UP (ref 32–37)
MCV RBC AUTO: 86.5 FL — SIGNIFICANT CHANGE UP (ref 80–94)
NRBC # BLD: 0 /100 WBCS — SIGNIFICANT CHANGE UP (ref 0–0)
PLATELET # BLD AUTO: 238 K/UL — SIGNIFICANT CHANGE UP (ref 130–400)
POTASSIUM SERPL-MCNC: 4.3 MMOL/L — SIGNIFICANT CHANGE UP (ref 3.5–5)
POTASSIUM SERPL-SCNC: 4.3 MMOL/L — SIGNIFICANT CHANGE UP (ref 3.5–5)
RBC # BLD: 3.77 M/UL — LOW (ref 4.7–6.1)
RBC # FLD: 11.9 % — SIGNIFICANT CHANGE UP (ref 11.5–14.5)
SODIUM SERPL-SCNC: 132 MMOL/L — LOW (ref 135–146)
WBC # BLD: 5.96 K/UL — SIGNIFICANT CHANGE UP (ref 4.8–10.8)
WBC # FLD AUTO: 5.96 K/UL — SIGNIFICANT CHANGE UP (ref 4.8–10.8)

## 2021-08-29 RX ORDER — FINASTERIDE 5 MG/1
1 TABLET, FILM COATED ORAL
Qty: 14 | Refills: 0
Start: 2021-08-29 | End: 2021-09-11

## 2021-08-29 RX ORDER — CEFPODOXIME PROXETIL 100 MG
1 TABLET ORAL
Qty: 24 | Refills: 0
Start: 2021-08-29 | End: 2021-09-09

## 2021-08-29 RX ADMIN — LOSARTAN POTASSIUM 100 MILLIGRAM(S): 100 TABLET, FILM COATED ORAL at 06:20

## 2021-08-29 RX ADMIN — FINASTERIDE 5 MILLIGRAM(S): 5 TABLET, FILM COATED ORAL at 12:10

## 2021-08-29 RX ADMIN — BICALUTAMIDE 50 MILLIGRAM(S): 50 TABLET, FILM COATED ORAL at 12:11

## 2021-08-29 RX ADMIN — Medication 25 MILLIGRAM(S): at 06:20

## 2021-08-29 RX ADMIN — Medication 81 MILLIGRAM(S): at 12:11

## 2021-08-29 RX ADMIN — GABAPENTIN 300 MILLIGRAM(S): 400 CAPSULE ORAL at 06:20

## 2021-08-29 RX ADMIN — Medication 4: at 12:08

## 2021-08-29 RX ADMIN — CEFEPIME 100 MILLIGRAM(S): 1 INJECTION, POWDER, FOR SOLUTION INTRAMUSCULAR; INTRAVENOUS at 06:20

## 2021-08-29 NOTE — DISCHARGE NOTE PROVIDER - NSDCCPCAREPLAN_GEN_ALL_CORE_FT
PRINCIPAL DISCHARGE DIAGNOSIS  Diagnosis: Sepsis secondary to UTI  Assessment and Plan of Treatment:       SECONDARY DISCHARGE DIAGNOSES  Diagnosis: Benign prostatic hyperplasia with urinary retention  Assessment and Plan of Treatment:

## 2021-08-29 NOTE — DISCHARGE NOTE PROVIDER - HOSPITAL COURSE
81 year old male with a history of HTN, DM, BPH, and Prostate Cancer presented to ED for fever and weakness. Found to be in Urinary Retention s/p Mccormick Catheter placement. Patient had ID consult which recommendations for IV atibiotics and d/c on PO Vantin 200 mg PO q12 until 09/10/2021. Patient had Trial of Void on 08/29/2021 when catheter was removed around 6:00 am. Bladder scan at 2:00 pm revealed 211 mL. Patient plan to be discharged home with daughter.

## 2021-08-29 NOTE — DISCHARGE NOTE PROVIDER - CARE PROVIDERS DIRECT ADDRESSES
[email or web address],DirectAddress_Unknown

## 2021-08-29 NOTE — DISCHARGE NOTE NURSING/CASE MANAGEMENT/SOCIAL WORK - PATIENT PORTAL LINK FT
You can access the FollowMyHealth Patient Portal offered by [facility] by registering at the following website: [email or web address]. By joining [employer]’s FollowMyHealth portal, you will also be able to view your health information using other applications (apps) compatible with our system.

## 2021-08-29 NOTE — CHART NOTE - NSCHARTNOTEFT_GEN_A_CORE
Bedside bladder scan 211 mL.   Patient is in no acute distress.   Patient urinating small amount of urine at a time on his own. Urine Clear yellow.

## 2021-08-29 NOTE — DISCHARGE NOTE PROVIDER - NSDCMRMEDTOKEN_GEN_ALL_CORE_FT
aspirin 81 mg oral tablet, chewable: 1 tab(s) orally once a day  bicalutamide 50 mg oral tablet: 1 tab(s) orally every 24 hours  gabapentin 300 mg oral capsule: 1 cap(s) orally 2 times a day  glipiZIDE 10 mg oral tablet: 1 tab(s) orally once a day  Lipitor 20 mg oral tablet: 1 tab(s) orally once a day  losartan-hydroCHLOROthiazide 100 mg-25 mg oral tablet: 1 tab(s) orally once a day  metFORMIN 1000 mg oral tablet: 1 tab(s) orally 2 times a day  tamsulosin 0.4 mg oral capsule: 1 cap(s) orally once a day

## 2021-08-31 ENCOUNTER — APPOINTMENT (OUTPATIENT)
Dept: UROLOGY | Facility: CLINIC | Age: 81
End: 2021-08-31
Payer: MEDICARE

## 2021-08-31 DIAGNOSIS — R33.9 RETENTION OF URINE, UNSPECIFIED: ICD-10-CM

## 2021-08-31 PROCEDURE — 96402 CHEMO HORMON ANTINEOPL SQ/IM: CPT

## 2021-08-31 PROCEDURE — 51798 US URINE CAPACITY MEASURE: CPT

## 2021-08-31 NOTE — HISTORY OF PRESENT ILLNESS
[FreeTextEntry1] : Patient with recent hospitalization when she was in urinary retention started on tamsulosin 0.4 daily and with trial of void was voiding act adequately.  He returns today to check his PVR and his current PVR is 120  .  Patient has a history of Cl[place] 7 prostate cancer his PSA was 25 and he was last given a month Eligard in March 2020.  He decided not to comply with radiation therapy.  He returns now for PVR scan and he is urinating adequately on tamsulosin.  His PSA is 25.  PSA right after previous Eligard was 1.7

## 2021-08-31 NOTE — REVIEW OF SYSTEMS
[Feeling Poorly] : not feeling poorly [Nasal Discharge] : no nasal discharge [Chest Pain] : no chest pain [Shortness Of Breath] : no shortness of breath [Abdominal Pain] : no abdominal pain [Dysuria] : no dysuria

## 2021-08-31 NOTE — ASSESSMENT
[FreeTextEntry1] : Fully discussed with patient and his daughter.  He has not complied with radiation therapy and decided not to proceed with that along with his daughter.  They discussed radical prostatectomy and I do not recommend this is the treatment in this age group with his comorbidities.  They want to proceed slowly with hormonal ablation.  Discussed risk benefits and alternatives and they understand this is not a cure and that he can develop metastases in the future.  3-month Eligard given to the abdomen subcutaneously lot [id number] exp 2/23.  Return to office 3 months with PSA prior.  He will continue tamsulosin 0.4 daily.

## 2021-09-01 LAB
CULTURE RESULTS: SIGNIFICANT CHANGE UP
CULTURE RESULTS: SIGNIFICANT CHANGE UP
SPECIMEN SOURCE: SIGNIFICANT CHANGE UP
SPECIMEN SOURCE: SIGNIFICANT CHANGE UP

## 2021-12-08 ENCOUNTER — APPOINTMENT (OUTPATIENT)
Dept: UROLOGY | Facility: CLINIC | Age: 81
End: 2021-12-08
Payer: MEDICARE

## 2021-12-08 DIAGNOSIS — R97.20 ELEVATED PROSTATE, SPECIFIC ANTIGEN [PSA]: ICD-10-CM

## 2021-12-08 PROCEDURE — 96402 CHEMO HORMON ANTINEOPL SQ/IM: CPT

## 2021-12-08 NOTE — PHYSICAL EXAM
[General Appearance - In No Acute Distress] : no acute distress [Abdomen Soft] : soft [Abdomen Tenderness] : non-tender [] : no respiratory distress [Oriented To Time, Place, And Person] : oriented to person, place, and time [Normal Station and Gait] : the gait and station were normal for the patient's age

## 2021-12-08 NOTE — ASSESSMENT
[FreeTextEntry1] : Improved PSA after 3-month Eligard depot.  Recommend continuing.  They are agreeable.  3-month Eligard given lot number [id number], expiration 3/23.  Repeat PSA 3 months

## 2021-12-08 NOTE — HISTORY OF PRESENT ILLNESS
[FreeTextEntry1] : 81-year-old with prostate cancer.  Patient and his daughter decided that they only want leuprolide and have refused radiation therapy.  Leuprolide 3 months ago given as PSA had increased to 25 and they are not compliant with regular dosing interval.  PSA now 4.1.  Patient has no bone pain,

## 2022-03-23 ENCOUNTER — APPOINTMENT (OUTPATIENT)
Dept: UROLOGY | Facility: CLINIC | Age: 82
End: 2022-03-23
Payer: MEDICARE

## 2022-03-23 VITALS — WEIGHT: 180 LBS | BODY MASS INDEX: 29.99 KG/M2 | HEIGHT: 65 IN

## 2022-03-23 PROCEDURE — 96402 CHEMO HORMON ANTINEOPL SQ/IM: CPT

## 2022-03-23 NOTE — PHYSICAL EXAM
[Abdomen Soft] : soft [General Appearance - In No Acute Distress] : no acute distress [Abdomen Tenderness] : non-tender [] : no respiratory distress [Oriented To Time, Place, And Person] : oriented to person, place, and time [Normal Station and Gait] : the gait and station were normal for the patient's age

## 2022-03-23 NOTE — ASSESSMENT
[FreeTextEntry1] : Prostate cancer receiving leuprolide monotherapy.  PSA decreased slightly but not significantly.  No sign of cord compression but discussed with daughter the signs and to call immediately if any of these signs occur.  3-month Eligard depot given to the abdominal subcu lot number [id number] expiration 6/23

## 2022-03-23 NOTE — REVIEW OF SYSTEMS
[Fever] : no fever [Feeling Poorly] : not feeling poorly [Sore Throat] : no sore throat [Chest Pain] : no chest pain [Cough] : no cough [Dysuria] : no dysuria [Confused] : no confusion

## 2022-03-23 NOTE — HISTORY OF PRESENT ILLNESS
[FreeTextEntry1] : 81-year-old with prostate cancer [place] 7 pretreatment PSA 24.9.  Metastatic work-up was negative.  Patient refused radiation therapy and is being treated with leuprolide.  Last leuprolide was December 8, 2021 3-month Eligard.  Current PSA 3.9 compared to 4.1 in November 2021.  He has chronic intermittent low back pain and neuropathy.  He did have an exacerbation of lower back pain recently which has since resolved

## 2022-07-13 ENCOUNTER — APPOINTMENT (OUTPATIENT)
Dept: UROLOGY | Facility: CLINIC | Age: 82
End: 2022-07-13

## 2022-07-15 ENCOUNTER — RX RENEWAL (OUTPATIENT)
Age: 82
End: 2022-07-15

## 2022-07-27 ENCOUNTER — APPOINTMENT (OUTPATIENT)
Dept: UROLOGY | Facility: CLINIC | Age: 82
End: 2022-07-27

## 2022-08-10 ENCOUNTER — APPOINTMENT (OUTPATIENT)
Dept: UROLOGY | Facility: CLINIC | Age: 82
End: 2022-08-10

## 2022-08-10 VITALS — TEMPERATURE: 98 F | WEIGHT: 180 LBS | HEIGHT: 65 IN | BODY MASS INDEX: 29.99 KG/M2

## 2022-08-10 DIAGNOSIS — R39.9 UNSPECIFIED SYMPTOMS AND SIGNS INVOLVING THE GENITOURINARY SYSTEM: ICD-10-CM

## 2022-08-10 PROCEDURE — 99213 OFFICE O/P EST LOW 20 MIN: CPT

## 2022-08-10 NOTE — HISTORY OF PRESENT ILLNESS
[FreeTextEntry1] : 82-year-old with history of Claudio 7 prostate cancer, pretreatment PSA 24.9.  Metastatic work-up was negative.  Patient refused radiation and is.  Treated with leuprolide intermittently.  Last leuprolide shot was 3/23/2022 it was a 3-month Eligard.  Current PSA 3.17 compared to 3.9 5 months ago.  No back pain.  No urinary Complaint on Flomax

## 2022-08-10 NOTE — ASSESSMENT
[FreeTextEntry1] : Stable PSA.  They would like to withdraw from leuprolide.  3-month depot was last given 5 months ago recommend rechecking PSA in 3 months to trend it.  No symptom or sign of metastatic disease. [Urinary Symptom or Sign (788.99\R39.89)] : implantation

## 2022-10-13 NOTE — ED ADULT NURSE NOTE - CAS TRG GEN SKIN CONDITION
Counseling and Referral of Other Preventative  (Italic type indicates deductible and co-insurance are waived)    Patient Name: Coral Pandya  Today's Date: 10/13/2022    Health Maintenance       Date Due Completion Date    High Dose Statin Never done ---    Lipid Panel 08/23/2022 8/23/2021    Mammogram 05/12/2023 5/12/2022    DEXA Scan 04/08/2024 4/8/2021    Override on 10/8/2019: Done    Override on 12/5/2016: Done    Override on 7/12/2012: Done (in media)    TETANUS VACCINE 02/06/2025 2/6/2015    Colorectal Cancer Screening 10/06/2025 10/6/2020    Override on 3/19/2008: (N/S)        No orders of the defined types were placed in this encounter.    The following information is provided to all patients.  This information is to help you find resources for any of the problems found today that may be affecting your health:                Living healthy guide: www.[facility].[email or web address].[facility]      Understanding Diabetes: www.diabetes.org      Eating healthy: www.cdc.gov/healthyweight      CDC home safety checklist: www.cdc.gov/steadi/patient.html      Agency on Aging: www.goea.[place].gov      Alcoholics anonymous (AA): www.aa.org      Physical Activity: www.katherine.nih.gov/zd3ujzv      Tobacco use: www.quitwithusla.org      Warm

## 2022-11-16 ENCOUNTER — APPOINTMENT (OUTPATIENT)
Dept: UROLOGY | Facility: CLINIC | Age: 82
End: 2022-11-16

## 2022-11-16 PROCEDURE — 99214 OFFICE O/P EST MOD 30 MIN: CPT

## 2023-02-22 ENCOUNTER — APPOINTMENT (OUTPATIENT)
Dept: UROLOGY | Facility: CLINIC | Age: 83
End: 2023-02-22
Payer: MEDICARE

## 2023-02-22 VITALS
TEMPERATURE: 97 F | WEIGHT: 180 LBS | DIASTOLIC BLOOD PRESSURE: 64 MMHG | HEART RATE: 97 BPM | RESPIRATION RATE: 18 BRPM | SYSTOLIC BLOOD PRESSURE: 116 MMHG | HEIGHT: 65 IN | OXYGEN SATURATION: 98 % | BODY MASS INDEX: 29.99 KG/M2

## 2023-02-22 PROCEDURE — 99213 OFFICE O/P EST LOW 20 MIN: CPT

## 2023-02-22 NOTE — ASSESSMENT
[FreeTextEntry1] : Discussed fully with patient and his daughter.  Currently PSA is Agnant acceptable level and will recheck in 3 months and give leuprolide if necessary based on rising PSA or symptoms

## 2023-02-22 NOTE — HISTORY OF PRESENT ILLNESS
[FreeTextEntry1] : 82-year-old with prostate cancer being treated with intermittent leuprolide therapy.  Patient had his last leuprolide November 16, 2022 and prior to that was 6 months earlier.  It took 6 months for his PSA to go up to 13 in November when he had his last leuprolide.  Currently PSA is 3.0 and patient has no new bone pain.  He has chronic lower extremity arthritis and uses a cane

## 2023-05-23 ENCOUNTER — APPOINTMENT (OUTPATIENT)
Dept: UROLOGY | Facility: CLINIC | Age: 83
End: 2023-05-23
Payer: MEDICARE

## 2023-05-23 VITALS
OXYGEN SATURATION: 98 % | SYSTOLIC BLOOD PRESSURE: 143 MMHG | TEMPERATURE: 97.5 F | DIASTOLIC BLOOD PRESSURE: 71 MMHG | HEART RATE: 89 BPM | WEIGHT: 180 LBS | RESPIRATION RATE: 18 BRPM | HEIGHT: 65 IN | BODY MASS INDEX: 29.99 KG/M2

## 2023-05-23 DIAGNOSIS — R97.20 ELEVATED PROSTATE, SPECIFIC ANTIGEN [PSA]: ICD-10-CM

## 2023-05-23 PROCEDURE — 99214 OFFICE O/P EST MOD 30 MIN: CPT | Mod: 25

## 2023-05-23 PROCEDURE — 99213 OFFICE O/P EST LOW 20 MIN: CPT

## 2023-05-23 PROCEDURE — 96402 CHEMO HORMON ANTINEOPL SQ/IM: CPT

## 2023-05-23 NOTE — ASSESSMENT
[FreeTextEntry1] : Patient with increased PSA on intermittent leuprolide therapy for prostate cancer.  Fully discussed with patient and his daughter and recommend proceeding with leuprolide injection today.  See hormone administration note.

## 2023-05-23 NOTE — HISTORY OF PRESENT ILLNESS
[FreeTextEntry1] : 83-year-old with prostate cancer treated with intermittent leuprolide therapy.  Last leuprolide November 16, 2022.  Current PSA increased to 13.  On intermittent leuprolide he has been getting leuprolide every 6 to 8 months based on PSA.  No new bone pain

## 2023-08-11 ENCOUNTER — RX RENEWAL (OUTPATIENT)
Age: 83
End: 2023-08-11

## 2023-08-11 RX ORDER — TAMSULOSIN HYDROCHLORIDE 0.4 MG/1
0.4 CAPSULE ORAL
Qty: 90 | Refills: 3 | Status: ACTIVE | COMMUNITY
Start: 2019-04-03 | End: 1900-01-01

## 2023-11-29 ENCOUNTER — APPOINTMENT (OUTPATIENT)
Dept: UROLOGY | Facility: CLINIC | Age: 83
End: 2023-11-29

## 2024-01-10 ENCOUNTER — APPOINTMENT (OUTPATIENT)
Dept: UROLOGY | Facility: CLINIC | Age: 84
End: 2024-01-10
Payer: MEDICARE

## 2024-01-10 DIAGNOSIS — C61 MALIGNANT NEOPLASM OF PROSTATE: ICD-10-CM

## 2024-01-10 DIAGNOSIS — R97.20 ELEVATED PROSTATE, SPECIFIC ANTIGEN [PSA]: ICD-10-CM

## 2024-01-10 PROCEDURE — 96402 CHEMO HORMON ANTINEOPL SQ/IM: CPT

## 2024-01-10 PROCEDURE — 99214 OFFICE O/P EST MOD 30 MIN: CPT | Mod: 25

## 2024-01-10 NOTE — HISTORY OF PRESENT ILLNESS
[FreeTextEntry1] : 83-year-old with prostate cancer treated with intermittent leuprolide therapy.  Last leuprolide given May 23, 2023 when his PSA was 13.  Current PSA 11.5.  No new bone pain.

## 2024-01-10 NOTE — ASSESSMENT
[FreeTextEntry1] : Patient has been receiving intermittent leuprolide therapy approximate every 6 months.  Discussed fully with patient and his daughter and leuprolide in the form of Eligard 22.5 mg subcutaneously given to the left side of abdomen.  DEA Dalton 97386-002-99, lot [id number], expiration 6/2025.

## 2024-01-10 NOTE — PHYSICAL EXAM
[General Appearance - In No Acute Distress] : no acute distress [] : no respiratory distress [Abdomen Soft] : soft [Normal Station and Gait] : the gait and station were normal for the patient's age

## 2024-07-16 ENCOUNTER — APPOINTMENT (OUTPATIENT)
Dept: UROLOGY | Facility: CLINIC | Age: 84
End: 2024-07-16
Payer: MEDICARE

## 2024-07-16 VITALS
HEIGHT: 65 IN | TEMPERATURE: 98 F | WEIGHT: 180 LBS | RESPIRATION RATE: 18 BRPM | SYSTOLIC BLOOD PRESSURE: 114 MMHG | OXYGEN SATURATION: 96 % | BODY MASS INDEX: 29.99 KG/M2 | HEART RATE: 81 BPM | DIASTOLIC BLOOD PRESSURE: 58 MMHG

## 2024-07-16 DIAGNOSIS — C61 MALIGNANT NEOPLASM OF PROSTATE: ICD-10-CM

## 2024-07-16 DIAGNOSIS — R97.20 ELEVATED PROSTATE, SPECIFIC ANTIGEN [PSA]: ICD-10-CM

## 2024-07-16 LAB
BILIRUB UR QL STRIP: NORMAL
COLLECTION METHOD: NORMAL
GLUCOSE UR-MCNC: 1000
HCG UR QL: 0.2 EU/DL
HGB UR QL STRIP.AUTO: NORMAL
KETONES UR-MCNC: NORMAL
LEUKOCYTE ESTERASE UR QL STRIP: NORMAL
NITRITE UR QL STRIP: NORMAL
PH UR STRIP: 5.5
PROT UR STRIP-MCNC: NORMAL
SP GR UR STRIP: 1.02

## 2024-07-16 PROCEDURE — 96402 CHEMO HORMON ANTINEOPL SQ/IM: CPT

## 2024-07-16 PROCEDURE — 81003 URINALYSIS AUTO W/O SCOPE: CPT | Mod: QW
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
## 2024-10-03 ENCOUNTER — TRANSCRIPTION ENCOUNTER (OUTPATIENT)
Age: 84
End: 2024-10-03

## 2024-10-14 ENCOUNTER — EMERGENCY (EMERGENCY)
Facility: HOSPITAL | Age: 84
LOS: 0 days | Discharge: HOME | End: 2024-10-15
Attending: EMERGENCY MEDICINE
Payer: MEDICARE

## 2024-10-14 VITALS
WEIGHT: 169.98 LBS | HEIGHT: 67 IN | RESPIRATION RATE: 22 BRPM | OXYGEN SATURATION: 97 % | HEART RATE: 60 BPM | DIASTOLIC BLOOD PRESSURE: 75 MMHG | TEMPERATURE: 98 F | SYSTOLIC BLOOD PRESSURE: 135 MMHG

## 2024-10-14 DIAGNOSIS — R53.1 WEAKNESS: ICD-10-CM

## 2024-10-14 DIAGNOSIS — I10 ESSENTIAL (PRIMARY) HYPERTENSION: ICD-10-CM

## 2024-10-14 DIAGNOSIS — R05.9 COUGH, UNSPECIFIED: ICD-10-CM

## 2024-10-14 DIAGNOSIS — E11.9 TYPE 2 DIABETES MELLITUS WITHOUT COMPLICATIONS: ICD-10-CM

## 2024-10-14 DIAGNOSIS — Z85.46 PERSONAL HISTORY OF MALIGNANT NEOPLASM OF PROSTATE: ICD-10-CM

## 2024-10-14 LAB
ALBUMIN SERPL ELPH-MCNC: 3.6 G/DL — SIGNIFICANT CHANGE UP (ref 3.5–5.2)
ALP SERPL-CCNC: 55 U/L — SIGNIFICANT CHANGE UP (ref 30–115)
ALT FLD-CCNC: 18 U/L — SIGNIFICANT CHANGE UP (ref 0–41)
ANION GAP SERPL CALC-SCNC: 10 MMOL/L — SIGNIFICANT CHANGE UP (ref 7–14)
APPEARANCE UR: CLEAR — SIGNIFICANT CHANGE UP
AST SERPL-CCNC: 21 U/L — SIGNIFICANT CHANGE UP (ref 0–41)
BACTERIA # UR AUTO: ABNORMAL /HPF
BASOPHILS # BLD AUTO: 0.07 K/UL — SIGNIFICANT CHANGE UP (ref 0–0.2)
BASOPHILS NFR BLD AUTO: 1.1 % — HIGH (ref 0–1)
BILIRUB DIRECT SERPL-MCNC: <0.2 MG/DL — SIGNIFICANT CHANGE UP (ref 0–0.3)
BILIRUB INDIRECT FLD-MCNC: >0.1 MG/DL — LOW (ref 0.2–1.2)
BILIRUB SERPL-MCNC: 0.3 MG/DL — SIGNIFICANT CHANGE UP (ref 0.2–1.2)
BILIRUB UR-MCNC: ABNORMAL
BUN SERPL-MCNC: 21 MG/DL — HIGH (ref 10–20)
CALCIUM SERPL-MCNC: 8.8 MG/DL — SIGNIFICANT CHANGE UP (ref 8.4–10.5)
CHLORIDE SERPL-SCNC: 102 MMOL/L — SIGNIFICANT CHANGE UP (ref 98–110)
CO2 SERPL-SCNC: 25 MMOL/L — SIGNIFICANT CHANGE UP (ref 17–32)
COLOR SPEC: SIGNIFICANT CHANGE UP
CREAT SERPL-MCNC: 1.4 MG/DL — SIGNIFICANT CHANGE UP (ref 0.7–1.5)
DIFF PNL FLD: NEGATIVE — SIGNIFICANT CHANGE UP
EGFR: 50 ML/MIN/1.73M2 — LOW
EOSINOPHIL # BLD AUTO: 0.22 K/UL — SIGNIFICANT CHANGE UP (ref 0–0.7)
EOSINOPHIL NFR BLD AUTO: 3.5 % — SIGNIFICANT CHANGE UP (ref 0–8)
EPI CELLS # UR: PRESENT
GLUCOSE SERPL-MCNC: 218 MG/DL — HIGH (ref 70–99)
GLUCOSE UR QL: >=1000 MG/DL
HCT VFR BLD CALC: 29.8 % — LOW (ref 42–52)
HGB BLD-MCNC: 10 G/DL — LOW (ref 14–18)
HYALINE CASTS # UR AUTO: PRESENT
IMM GRANULOCYTES NFR BLD AUTO: 0.3 % — SIGNIFICANT CHANGE UP (ref 0.1–0.3)
KETONES UR-MCNC: ABNORMAL MG/DL
LEUKOCYTE ESTERASE UR-ACNC: NEGATIVE — SIGNIFICANT CHANGE UP
LIDOCAIN IGE QN: 24 U/L — SIGNIFICANT CHANGE UP (ref 7–60)
LYMPHOCYTES # BLD AUTO: 1.63 K/UL — SIGNIFICANT CHANGE UP (ref 1.2–3.4)
LYMPHOCYTES # BLD AUTO: 26.2 % — SIGNIFICANT CHANGE UP (ref 20.5–51.1)
MAGNESIUM SERPL-MCNC: 2 MG/DL — SIGNIFICANT CHANGE UP (ref 1.8–2.4)
MCHC RBC-ENTMCNC: 28.4 PG — SIGNIFICANT CHANGE UP (ref 27–31)
MCHC RBC-ENTMCNC: 33.6 G/DL — SIGNIFICANT CHANGE UP (ref 32–37)
MCV RBC AUTO: 84.7 FL — SIGNIFICANT CHANGE UP (ref 80–94)
MONOCYTES # BLD AUTO: 0.58 K/UL — SIGNIFICANT CHANGE UP (ref 0.1–0.6)
MONOCYTES NFR BLD AUTO: 9.3 % — SIGNIFICANT CHANGE UP (ref 1.7–9.3)
NEUTROPHILS # BLD AUTO: 3.69 K/UL — SIGNIFICANT CHANGE UP (ref 1.4–6.5)
NEUTROPHILS NFR BLD AUTO: 59.6 % — SIGNIFICANT CHANGE UP (ref 42.2–75.2)
NITRITE UR-MCNC: NEGATIVE — SIGNIFICANT CHANGE UP
NRBC # BLD: 0 /100 WBCS — SIGNIFICANT CHANGE UP (ref 0–0)
PH UR: 5.5 — SIGNIFICANT CHANGE UP (ref 5–8)
PLATELET # BLD AUTO: 198 K/UL — SIGNIFICANT CHANGE UP (ref 130–400)
PMV BLD: 9.7 FL — SIGNIFICANT CHANGE UP (ref 7.4–10.4)
POTASSIUM SERPL-MCNC: 4.6 MMOL/L — SIGNIFICANT CHANGE UP (ref 3.5–5)
POTASSIUM SERPL-SCNC: 4.6 MMOL/L — SIGNIFICANT CHANGE UP (ref 3.5–5)
PROT SERPL-MCNC: 5.9 G/DL — LOW (ref 6–8)
PROT UR-MCNC: 300 MG/DL
RBC # BLD: 3.52 M/UL — LOW (ref 4.7–6.1)
RBC # FLD: 12 % — SIGNIFICANT CHANGE UP (ref 11.5–14.5)
RBC CASTS # UR COMP ASSIST: 2 /HPF — SIGNIFICANT CHANGE UP (ref 0–4)
RENAL EPI CELLS # UR COMP ASSIST: PRESENT
SODIUM SERPL-SCNC: 137 MMOL/L — SIGNIFICANT CHANGE UP (ref 135–146)
SP GR SPEC: 1.03 — SIGNIFICANT CHANGE UP (ref 1–1.03)
SQUAMOUS # UR AUTO: 4 /HPF — SIGNIFICANT CHANGE UP (ref 0–5)
UROBILINOGEN FLD QL: 1 MG/DL — SIGNIFICANT CHANGE UP (ref 0.2–1)
WBC # BLD: 6.21 K/UL — SIGNIFICANT CHANGE UP (ref 4.8–10.8)
WBC # FLD AUTO: 6.21 K/UL — SIGNIFICANT CHANGE UP (ref 4.8–10.8)
WBC UR QL: 8 /HPF — HIGH (ref 0–5)

## 2024-10-14 PROCEDURE — 99285 EMERGENCY DEPT VISIT HI MDM: CPT

## 2024-10-14 PROCEDURE — 70450 CT HEAD/BRAIN W/O DYE: CPT | Mod: MC

## 2024-10-14 PROCEDURE — 80076 HEPATIC FUNCTION PANEL: CPT

## 2024-10-14 PROCEDURE — 72125 CT NECK SPINE W/O DYE: CPT | Mod: 26,MC

## 2024-10-14 PROCEDURE — 93005 ELECTROCARDIOGRAM TRACING: CPT

## 2024-10-14 PROCEDURE — 70450 CT HEAD/BRAIN W/O DYE: CPT | Mod: 26,MC

## 2024-10-14 PROCEDURE — 87086 URINE CULTURE/COLONY COUNT: CPT

## 2024-10-14 PROCEDURE — 36415 COLL VENOUS BLD VENIPUNCTURE: CPT

## 2024-10-14 PROCEDURE — 80048 BASIC METABOLIC PNL TOTAL CA: CPT

## 2024-10-14 PROCEDURE — 83735 ASSAY OF MAGNESIUM: CPT

## 2024-10-14 PROCEDURE — 72125 CT NECK SPINE W/O DYE: CPT | Mod: MC

## 2024-10-14 PROCEDURE — 93010 ELECTROCARDIOGRAM REPORT: CPT

## 2024-10-14 PROCEDURE — 81001 URINALYSIS AUTO W/SCOPE: CPT

## 2024-10-14 PROCEDURE — 83690 ASSAY OF LIPASE: CPT

## 2024-10-14 PROCEDURE — 99285 EMERGENCY DEPT VISIT HI MDM: CPT | Mod: 25

## 2024-10-14 PROCEDURE — 85025 COMPLETE CBC W/AUTO DIFF WBC: CPT

## 2024-10-14 PROCEDURE — 71045 X-RAY EXAM CHEST 1 VIEW: CPT

## 2024-10-14 PROCEDURE — 71045 X-RAY EXAM CHEST 1 VIEW: CPT | Mod: 26

## 2024-10-14 RX ORDER — SODIUM CHLORIDE 0.9 % (FLUSH) 0.9 %
1000 SYRINGE (ML) INJECTION ONCE
Refills: 0 | Status: COMPLETED | OUTPATIENT
Start: 2024-10-14 | End: 2024-10-14

## 2024-10-14 RX ADMIN — Medication 1000 MILLILITER(S): at 22:24

## 2024-10-15 VITALS
HEART RATE: 65 BPM | DIASTOLIC BLOOD PRESSURE: 76 MMHG | RESPIRATION RATE: 20 BRPM | TEMPERATURE: 98 F | OXYGEN SATURATION: 97 % | SYSTOLIC BLOOD PRESSURE: 175 MMHG

## 2024-10-15 RX ORDER — CEFPODOXIME PROXETIL 50 MG/5 ML
200 SUSPENSION, RECONSTITUTED, ORAL (ML) ORAL ONCE
Refills: 0 | Status: COMPLETED | OUTPATIENT
Start: 2024-10-15 | End: 2024-10-15

## 2024-10-15 RX ORDER — CEFPODOXIME PROXETIL 50 MG/5 ML
1 SUSPENSION, RECONSTITUTED, ORAL (ML) ORAL
Qty: 14 | Refills: 0
Start: 2024-10-15 | End: 2024-10-21

## 2024-10-15 RX ADMIN — Medication 200 MILLIGRAM(S): at 00:30

## 2024-10-15 NOTE — ED PROVIDER NOTE - PATIENT PORTAL LINK FT
You can access the FollowMyHealth Patient Portal offered by [facility] by registering at the following website: [email or web address]. By joining [employer]’s FollowMyHealth portal, you will also be able to view your health information using other applications (apps) compatible with our system.

## 2024-10-15 NOTE — ED PROVIDER NOTE - WHICH SHOWED
independent interpretation,   by myself Dr eParce, of CXR -  no ptx no opacity not wide  no water bottle heart independent interpretation,   by myself Dr Pearce, of CXR -  no ptx no opacity not wide  no water bottle heart  I  Dr Pearce performed independent interpretation of EKG  -  nsr no stemi no acute ischemia

## 2024-10-15 NOTE — ED PROVIDER NOTE - CLINICAL SUMMARY MEDICAL DECISION MAKING FREE TEXT BOX
84 year old male with a pmhx of HTN, DM, prostate CA now on HRT and tamsulosin, [place],   recent admission 10/1-10/ 3 for  weakness fall, no  injury,  had  chest pain with downtrending troponing,  ALTA,  hgbA1c 12  - presents today  for  generalized weakness  noted by  son tonight. Pt was sleeping on the couhg - usually he gets up and goes to bed to sleep after  being on the couhg,  but after son woke him up , patient lied back down on the couch , did not want to up to bed.  no fal sheeba trauma  acting himself earlier in day ,  eating and dirnking normally.  Pt  admits to feeling weak  no other  complaints     I  Dr Joe performed independent interpretation of EKG  -  nsr no stemi no acute ischemia - old lbbb no sgarbosa  Labs resulted at the time of my review were noted to be within acceptable parameters in comparison to previosu labs   independent interpretation,   by myself Dr Joe, of CXR -  no ptx no opacity not wide  no water bottle heart  ct head no acute pathology    wbc and bacteria in urine  0 abx given and sent to [facility]   Patient to be discharged from ED in well appearing condition. Any available test results were discussed with and printed  for patient.  Verbal instructions given, including instructions to return to ED immediately for any new, worsening, or concerning symptoms. Limitations of ED work up discussed.  Patient reports understanding of above with capacity and insight. Written discharge instructions additionally given, including follow-up plan. 84 year old male with a pmhx of HTN, DM, prostate CA now on HRT and tamsulosin, [place],   recent admission 10/1-10/ 3 for  weakness fall, no  injury,  had  chest pain with downtrending troponin,  ALTA,  hgbA1c 12  - presents today  for  generalized weakness  noted by  son tonight. Pt was sleeping on the couhg - usually he gets up and goes to bed to sleep after  being on the couch,  but after son woke him up , patient lied back down on the couch , did not want to up to bed.  no fal sheeba trauma  acting himself earlier in day ,  eating and drinking normally. no cough  cp abdominal pain increased urination   no diarrea vomiting. no change in stool color,   Pt  admits to feeling weak  no other  complaints     I  Dr Joe performed independent interpretation of EKG  -  nsr no stemi no acute ischemia - old lbbb no sgarbosa  Labs resulted at the time of my review were noted to be within acceptable parameters in comparison to previosu labs   independent interpretation,   by myself Dr Joe, of CXR -  no ptx no opacity not wide  no water bottle heart  ct head no acute pathology    wbc and bacteria in urine  0 abx given and sent to [facility]   Patient to be discharged from ED in well appearing condition. Any available test results were discussed with and printed  for patient.  Verbal instructions given, including instructions to return to ED immediately for any new, worsening, or concerning symptoms. Limitations of ED work up discussed.  Patient reports understanding of above with capacity and insight. Written discharge instructions additionally given, including follow-up plan.

## 2024-10-15 NOTE — ED PROVIDER NOTE - OBJECTIVE STATEMENT
84 year old male with a past medical history of Hypertension, Diabetes, prostate CA now on HRT and tamsulosin, [place],   recent admission 10/1-10/ 3 for  weakness fall, no  injury,  had  chest pain with downtrending troponin,  ALTA,  hgbA1c 12  - presents today  for  generalized weakness  noted by  son tonight. Pt was sleeping on the cough - usually he gets up and goes to bed to sleep after  being on the couch, but after son woke him up , patient lied back down on the couch , did not want to up to bed.  no fall or trauma  acting himself earlier in da , eating and drinking normally. no cough  cp abdominal pain increased urination   no diarrhea vomiting. no change in stool color,

## 2024-10-15 NOTE — ED PROVIDER NOTE - NSFOLLOWUPINSTRUCTIONS_ED_ALL_ED_FT
RETURN TO ED  FOR ANY NEW WORSENING OR CONCERNING SYMPTOMS TO YOU, ALSO AS WE DISCUSSED. WE ARE HERE AND HAPPY TO TAKE CARE OF YOU. OTHERWISE FOLLOW UP WITH YOUR PRIMARY DOCTOR IN 1-3 DAYS      Weakness  ImageWeakness is a lack of strength. You may feel weak all over your body (generalized), or you may feel weak in one specific part of your body (focal). Common causes of weakness include:    Infection and immune system disorders.  Physical exhaustion.  Internal bleeding or other blood loss that results in a lack of red blood cells (anemia).  Dehydration.  An imbalance in mineral (electrolyte) levels, such as potassium.  Heart disease, circulation problems, or stroke.    Other causes include:    Some medicines or cancer treatment.  Stress, anxiety, or depression.  Nervous system disorders.  Thyroid disorders.  Loss of muscle strength because of age or inactivity.  Poor sleep quality or sleep disorders.    The cause of your weakness may not be known. Some causes of weakness can be serious, so it is important to see your health care provider.    Follow these instructions at home:  Rest as needed.  Try to get enough sleep. Talk to your health care provider about how much sleep you need each night.  Take over-the-counter and prescription medicines only as told by your health care provider.  Eat a healthy, well-balanced diet. This includes:    Proteins to build muscles, such as lean meats and fish.  Fresh fruits and vegetables.  Carbohydrates to boost energy, such as whole grains.    Drink enough fluid to keep your urine clear or pale yellow.  Do strength exercises, such as arm curls and leg raises, for 30 minutes at least 2 days a week or as told by your health care provider.  Consider working with a physical therapist or  who can develop an exercise plan to help you gain muscle strength.  Keep all follow-up visits as told by your health care provider. This is important.  Contact a health care provider if:  Your weakness does not improve or gets worse.  Your weakness affects your ability to think clearly.  Your weakness affects your ability to do your normal daily activities.  Get help right away if:  You develop sudden weakness.  You have trouble breathing or shortness of breath.  You have problems with your vision.  You have trouble talking or swallowing.  You have trouble standing or walking.  You have chest pain.  You are light-headed or lose consciousness.  This information is not intended to replace advice given to you by your health care provider. Make sure you discuss any questions you have with your health care provider          Urinary Tract Infection, Adult  ImageA urinary tract infection (UTI) is an infection of any part of the urinary tract, which includes the kidneys, ureters, bladder, and urethra. These organs make, store, and get rid of urine in the body. UTI can be a bladder infection (cystitis) or kidney infection (pyelonephritis).    What are the causes?  This infection may be caused by fungi, viruses, or bacteria. Bacteria are the most common cause of UTIs. This condition can also be caused by repeated incomplete emptying of the bladder during urination.    What increases the risk?  This condition is more likely to develop if:    You ignore your need to urinate or hold urine for long periods of time.  You do not empty your bladder completely during urination.  You wipe back to front after urinating or having a bowel movement, if you are female.  You are uncircumcised, if you are male.  You are constipated.  You have a urinary catheter that stays in place (indwelling).  You have a weak defense (immune) system.  You have a medical condition that affects your bowels, kidneys, or bladder.  You have diabetes.  You take antibiotic medicines frequently or for long periods of time, and the antibiotics no longer work well against certain types of infections (antibiotic resistance).  You take medicines that irritate your urinary tract.  You are exposed to chemicals that irritate your urinary tract.  You are female.    What are the signs or symptoms?  Symptoms of this condition include:    Fever.  Frequent urination or passing small amounts of urine frequently.  Needing to urinate urgently.  Pain or burning with urination.  Urine that smells bad or unusual.  Cloudy urine.  Pain in the lower abdomen or back.  Trouble urinating.  Blood in the urine.  Vomiting or being less hungry than normal.  Diarrhea or abdominal pain.  Vaginal discharge, if you are female.    How is this diagnosed?  This condition is diagnosed with a medical history and physical exam. You will also need to provide a urine sample to test your urine. Other tests may be done, including:    Blood tests.  Sexually transmitted disease (STD) testing.    If you have had more than one UTI, a cystoscopy or imaging studies may be done to determine the cause of the infections.    How is this treated?  Treatment for this condition often includes a combination of two or more of the following:    Antibiotic medicine.  Other medicines to treat less common causes of UTI.  Over-the-counter medicines to treat pain.  Drinking enough water to stay hydrated.    Follow these instructions at home:  Take over-the-counter and prescription medicines only as told by your health care provider.  If you were prescribed an antibiotic, take it as told by your health care provider. Do not stop taking the antibiotic even if you start to feel better.  Avoid alcohol, caffeine, tea, and carbonated beverages. They can irritate your bladder.  Drink enough fluid to keep your urine clear or pale yellow.  Keep all follow-up visits as told by your health care provider. This is important.  Make sure to:    Empty your bladder often and completely. Do not hold urine for long periods of time.  Empty your bladder before and after sex.  Wipe from front to back after a bowel movement if you are female. Use each tissue one time when you wipe.    Contact a health care provider if:  You have back pain.  You have a fever.  You feel nauseous or vomit.  Your symptoms do not get better after 3 days.  Your symptoms go away and then return.  Get help right away if:  You have severe back pain or lower abdominal pain.  You are vomiting and cannot keep down any medicines or water.  This information is not intended to replace advice given to you by your health care provider. Make sure you discuss any questions you have with your health care provider

## 2024-10-17 LAB
CULTURE RESULTS: ABNORMAL
SPECIMEN SOURCE: SIGNIFICANT CHANGE UP

## 2024-12-17 ENCOUNTER — APPOINTMENT (OUTPATIENT)
Dept: UROLOGY | Facility: CLINIC | Age: 84
End: 2024-12-17

## 2025-03-04 ENCOUNTER — APPOINTMENT (OUTPATIENT)
Dept: UROLOGY | Facility: CLINIC | Age: 85
End: 2025-03-04

## 2025-04-23 ENCOUNTER — APPOINTMENT (OUTPATIENT)
Dept: UROLOGY | Facility: CLINIC | Age: 85
End: 2025-04-23
Payer: MEDICARE

## 2025-04-23 VITALS
HEIGHT: 65 IN | HEART RATE: 84 BPM | BODY MASS INDEX: 29.99 KG/M2 | DIASTOLIC BLOOD PRESSURE: 68 MMHG | SYSTOLIC BLOOD PRESSURE: 117 MMHG | WEIGHT: 180 LBS

## 2025-04-23 DIAGNOSIS — R97.20 ELEVATED PROSTATE, SPECIFIC ANTIGEN [PSA]: ICD-10-CM

## 2025-04-23 DIAGNOSIS — C61 MALIGNANT NEOPLASM OF PROSTATE: ICD-10-CM

## 2025-04-23 PROCEDURE — 99214 OFFICE O/P EST MOD 30 MIN: CPT | Mod: 25

## 2025-04-23 PROCEDURE — 96402 CHEMO HORMON ANTINEOPL SQ/IM: CPT
